# Patient Record
Sex: FEMALE | Race: WHITE | Employment: OTHER | URBAN - METROPOLITAN AREA
[De-identification: names, ages, dates, MRNs, and addresses within clinical notes are randomized per-mention and may not be internally consistent; named-entity substitution may affect disease eponyms.]

---

## 2017-02-07 ENCOUNTER — TELEPHONE (OUTPATIENT)
Dept: CARDIOLOGY CLINIC | Age: 67
End: 2017-02-07

## 2017-02-08 ENCOUNTER — CLINICAL SUPPORT (OUTPATIENT)
Dept: CARDIOLOGY CLINIC | Age: 67
End: 2017-02-08

## 2017-02-08 DIAGNOSIS — R06.09 DOE (DYSPNEA ON EXERTION): Primary | ICD-10-CM

## 2017-02-08 NOTE — PROGRESS NOTES
See scanned report. Dr. Madeline Herbert ordered and Dr. Madeline Herbert read study. ID verified per protocol.

## 2017-10-16 ENCOUNTER — APPOINTMENT (OUTPATIENT)
Dept: GENERAL RADIOLOGY | Age: 67
End: 2017-10-16
Attending: EMERGENCY MEDICINE
Payer: MEDICARE

## 2017-10-16 ENCOUNTER — HOSPITAL ENCOUNTER (EMERGENCY)
Age: 67
Discharge: HOME | End: 2017-10-16
Attending: EMERGENCY MEDICINE | Admitting: EMERGENCY MEDICINE
Payer: MEDICARE

## 2017-10-16 VITALS
DIASTOLIC BLOOD PRESSURE: 59 MMHG | BODY MASS INDEX: 27.94 KG/M2 | HEIGHT: 61 IN | TEMPERATURE: 98.1 F | SYSTOLIC BLOOD PRESSURE: 133 MMHG | WEIGHT: 148 LBS | RESPIRATION RATE: 14 BRPM | HEART RATE: 76 BPM | OXYGEN SATURATION: 100 %

## 2017-10-16 DIAGNOSIS — R07.9 CHEST PAIN, UNSPECIFIED TYPE: Primary | ICD-10-CM

## 2017-10-16 DIAGNOSIS — R06.2 WHEEZING: ICD-10-CM

## 2017-10-16 DIAGNOSIS — J20.9 ACUTE BRONCHITIS, UNSPECIFIED ORGANISM: ICD-10-CM

## 2017-10-16 LAB
ALBUMIN SERPL-MCNC: 3.5 G/DL (ref 3.5–5)
ALBUMIN/GLOB SERPL: 0.9 {RATIO} (ref 1.1–2.2)
ALP SERPL-CCNC: 124 U/L (ref 45–117)
ALT SERPL-CCNC: 24 U/L (ref 12–78)
ANION GAP SERPL CALC-SCNC: 9 MMOL/L (ref 5–15)
AST SERPL-CCNC: 15 U/L (ref 15–37)
ATRIAL RATE: 81 BPM
BASOPHILS # BLD: 0 K/UL (ref 0–0.1)
BASOPHILS NFR BLD: 0 % (ref 0–1)
BILIRUB SERPL-MCNC: 0.3 MG/DL (ref 0.2–1)
BUN SERPL-MCNC: 20 MG/DL (ref 6–20)
BUN/CREAT SERPL: 22 (ref 12–20)
CALCIUM SERPL-MCNC: 9 MG/DL (ref 8.5–10.1)
CALCULATED P AXIS, ECG09: 20 DEGREES
CALCULATED R AXIS, ECG10: -3 DEGREES
CALCULATED T AXIS, ECG11: 43 DEGREES
CHLORIDE SERPL-SCNC: 102 MMOL/L (ref 97–108)
CO2 SERPL-SCNC: 29 MMOL/L (ref 21–32)
CREAT SERPL-MCNC: 0.89 MG/DL (ref 0.55–1.02)
DIAGNOSIS, 93000: NORMAL
EOSINOPHIL # BLD: 0.2 K/UL (ref 0–0.4)
EOSINOPHIL NFR BLD: 2 % (ref 0–7)
ERYTHROCYTE [DISTWIDTH] IN BLOOD BY AUTOMATED COUNT: 13 % (ref 11.5–14.5)
GLOBULIN SER CALC-MCNC: 3.7 G/DL (ref 2–4)
GLUCOSE SERPL-MCNC: 151 MG/DL (ref 65–100)
HCT VFR BLD AUTO: 37.9 % (ref 35–47)
HGB BLD-MCNC: 12.5 G/DL (ref 11.5–16)
LYMPHOCYTES # BLD: 2.1 K/UL (ref 0.8–3.5)
LYMPHOCYTES NFR BLD: 16 % (ref 12–49)
MAGNESIUM SERPL-MCNC: 1.8 MG/DL (ref 1.6–2.4)
MCH RBC QN AUTO: 31.6 PG (ref 26–34)
MCHC RBC AUTO-ENTMCNC: 33 G/DL (ref 30–36.5)
MCV RBC AUTO: 95.7 FL (ref 80–99)
MONOCYTES # BLD: 0.8 K/UL (ref 0–1)
MONOCYTES NFR BLD: 6 % (ref 5–13)
NEUTS SEG # BLD: 9.7 K/UL (ref 1.8–8)
NEUTS SEG NFR BLD: 76 % (ref 32–75)
P-R INTERVAL, ECG05: 112 MS
PLATELET # BLD AUTO: 264 K/UL (ref 150–400)
POTASSIUM SERPL-SCNC: 3.2 MMOL/L (ref 3.5–5.1)
PROT SERPL-MCNC: 7.2 G/DL (ref 6.4–8.2)
Q-T INTERVAL, ECG07: 354 MS
QRS DURATION, ECG06: 78 MS
QTC CALCULATION (BEZET), ECG08: 411 MS
RBC # BLD AUTO: 3.96 M/UL (ref 3.8–5.2)
SODIUM SERPL-SCNC: 140 MMOL/L (ref 136–145)
TROPONIN I SERPL-MCNC: <0.04 NG/ML
VENTRICULAR RATE, ECG03: 81 BPM
WBC # BLD AUTO: 12.8 K/UL (ref 3.6–11)

## 2017-10-16 PROCEDURE — 99284 EMERGENCY DEPT VISIT MOD MDM: CPT

## 2017-10-16 PROCEDURE — 74011000250 HC RX REV CODE- 250: Performed by: EMERGENCY MEDICINE

## 2017-10-16 PROCEDURE — 96375 TX/PRO/DX INJ NEW DRUG ADDON: CPT

## 2017-10-16 PROCEDURE — 96361 HYDRATE IV INFUSION ADD-ON: CPT

## 2017-10-16 PROCEDURE — 83735 ASSAY OF MAGNESIUM: CPT | Performed by: EMERGENCY MEDICINE

## 2017-10-16 PROCEDURE — 96374 THER/PROPH/DIAG INJ IV PUSH: CPT

## 2017-10-16 PROCEDURE — 94640 AIRWAY INHALATION TREATMENT: CPT

## 2017-10-16 PROCEDURE — 85025 COMPLETE CBC W/AUTO DIFF WBC: CPT | Performed by: EMERGENCY MEDICINE

## 2017-10-16 PROCEDURE — 71020 XR CHEST PA LAT: CPT

## 2017-10-16 PROCEDURE — 94761 N-INVAS EAR/PLS OXIMETRY MLT: CPT

## 2017-10-16 PROCEDURE — 80053 COMPREHEN METABOLIC PANEL: CPT | Performed by: EMERGENCY MEDICINE

## 2017-10-16 PROCEDURE — 74011250636 HC RX REV CODE- 250/636: Performed by: EMERGENCY MEDICINE

## 2017-10-16 PROCEDURE — 77030013140 HC MSK NEB VYRM -A

## 2017-10-16 PROCEDURE — 84484 ASSAY OF TROPONIN QUANT: CPT | Performed by: EMERGENCY MEDICINE

## 2017-10-16 PROCEDURE — 93005 ELECTROCARDIOGRAM TRACING: CPT

## 2017-10-16 RX ORDER — SODIUM CHLORIDE 0.9 % (FLUSH) 0.9 %
5-10 SYRINGE (ML) INJECTION EVERY 8 HOURS
Status: DISCONTINUED | OUTPATIENT
Start: 2017-10-16 | End: 2017-10-16 | Stop reason: HOSPADM

## 2017-10-16 RX ORDER — SODIUM CHLORIDE 0.9 % (FLUSH) 0.9 %
5-10 SYRINGE (ML) INJECTION AS NEEDED
Status: DISCONTINUED | OUTPATIENT
Start: 2017-10-16 | End: 2017-10-16 | Stop reason: HOSPADM

## 2017-10-16 RX ORDER — KETOROLAC TROMETHAMINE 30 MG/ML
15 INJECTION, SOLUTION INTRAMUSCULAR; INTRAVENOUS
Status: COMPLETED | OUTPATIENT
Start: 2017-10-16 | End: 2017-10-16

## 2017-10-16 RX ORDER — ALBUTEROL SULFATE 0.83 MG/ML
2.5 SOLUTION RESPIRATORY (INHALATION)
Qty: 24 EACH | Refills: 0 | Status: SHIPPED | OUTPATIENT
Start: 2017-10-16

## 2017-10-16 RX ORDER — DOXYCYCLINE 100 MG/1
100 TABLET ORAL 2 TIMES DAILY
Qty: 20 TAB | Refills: 0 | Status: SHIPPED | OUTPATIENT
Start: 2017-10-16 | End: 2017-10-26

## 2017-10-16 RX ORDER — PREDNISONE 10 MG/1
TABLET ORAL
Qty: 21 TAB | Refills: 0 | Status: SHIPPED | OUTPATIENT
Start: 2017-10-16 | End: 2018-03-19 | Stop reason: ALTCHOICE

## 2017-10-16 RX ORDER — DOXYCYCLINE 100 MG/1
100 TABLET ORAL 2 TIMES DAILY
Qty: 20 TAB | Refills: 0 | Status: SHIPPED | OUTPATIENT
Start: 2017-10-16 | End: 2017-10-16

## 2017-10-16 RX ORDER — ALBUTEROL SULFATE 90 UG/1
2 AEROSOL, METERED RESPIRATORY (INHALATION)
Qty: 1 INHALER | Refills: 0 | Status: SHIPPED | OUTPATIENT
Start: 2017-10-16

## 2017-10-16 RX ADMIN — ALBUTEROL SULFATE 1 DOSE: 2.5 SOLUTION RESPIRATORY (INHALATION) at 03:38

## 2017-10-16 RX ADMIN — SODIUM CHLORIDE 1000 ML: 9 INJECTION, SOLUTION INTRAVENOUS at 02:34

## 2017-10-16 RX ADMIN — KETOROLAC TROMETHAMINE 15 MG: 30 INJECTION, SOLUTION INTRAMUSCULAR at 03:38

## 2017-10-16 RX ADMIN — ALBUTEROL SULFATE 1 DOSE: 2.5 SOLUTION RESPIRATORY (INHALATION) at 02:33

## 2017-10-16 RX ADMIN — METHYLPREDNISOLONE SODIUM SUCCINATE 125 MG: 125 INJECTION, POWDER, FOR SOLUTION INTRAMUSCULAR; INTRAVENOUS at 02:34

## 2017-10-16 NOTE — ED PROVIDER NOTES
HPI Comments: 74 y.o. female with past medical history significant for HTN, Hypercholesterolemia who presents from home with chief complaint of chest pain. Pt reports 1 day hx of persistent chest pain and tightness. Pt describes the pain as \"burning\" accompanied by SOB. She states that it is difficult to take a deep breath and has also had a productive cough with clear phlegm. Pt is a smoker but denies smoking within the last three days. There are no other acute medical concerns at this time. PCP: Urszula Shaffer MD    Note written by Nj Bee, as dictated by Andre Scott DO 2:23 AM    The history is provided by the patient. No  was used. Past Medical History:   Diagnosis Date    Anxiety     Depression     Falls     Fatigue     FH: mental illness     Hypercholesterolemia     Hypertension     Vertigo     Visual disturbance        No past surgical history on file. No family history on file. Social History     Social History    Marital status:      Spouse name: N/A    Number of children: N/A    Years of education: N/A     Occupational History    Not on file. Social History Main Topics    Smoking status: Current Every Day Smoker     Packs/day: 0.50    Smokeless tobacco: Not on file    Alcohol use No    Drug use: No    Sexual activity: Not on file     Other Topics Concern    Not on file     Social History Narrative     ALLERGIES: Pcn [penicillins]    Review of Systems   Constitutional: Negative for appetite change, chills, fever and unexpected weight change. HENT: Negative for ear pain, hearing loss, rhinorrhea and trouble swallowing. Eyes: Negative for pain and visual disturbance. Respiratory: Positive for cough (productive) and chest tightness. Negative for shortness of breath. Cardiovascular: Positive for chest pain. Negative for palpitations.    Gastrointestinal: Negative for abdominal distention, abdominal pain, blood in stool and vomiting. Genitourinary: Negative for dysuria, hematuria and urgency. Musculoskeletal: Negative for back pain and myalgias. Skin: Negative for rash. Neurological: Negative for dizziness, syncope, weakness and numbness. Psychiatric/Behavioral: Negative for confusion and suicidal ideas. All other systems reviewed and are negative. Vitals:    10/16/17 0202 10/16/17 0217   BP: 147/72    Pulse: 79    Resp: 18    Temp: 98.1 °F (36.7 °C)    SpO2: 99% 97%   Weight: 67.1 kg (148 lb)    Height: 5' 1\" (1.549 m)             Physical Exam   Constitutional: She is oriented to person, place, and time. She appears well-developed and well-nourished. No distress. HENT:   Head: Normocephalic and atraumatic. Right Ear: External ear normal.   Left Ear: External ear normal.   Nose: Nose normal.   Mouth/Throat: Oropharynx is clear and moist. No oropharyngeal exudate. Eyes: Conjunctivae and EOM are normal. Pupils are equal, round, and reactive to light. Right eye exhibits no discharge. Left eye exhibits no discharge. No scleral icterus. Neck: Normal range of motion. Neck supple. No JVD present. No tracheal deviation present. Cardiovascular: Normal rate, regular rhythm, normal heart sounds and intact distal pulses. Exam reveals no gallop and no friction rub. No murmur heard. Pulmonary/Chest: Effort normal. No stridor. She has decreased breath sounds in the left lower field. She has wheezes (faint, bibasilar). She has no rhonchi. She exhibits no tenderness. Prolonged exhalation   Abdominal: Soft. Bowel sounds are normal. She exhibits no distension. There is no tenderness. There is no rebound and no guarding. Musculoskeletal: Normal range of motion. She exhibits no edema or tenderness. Neurological: She is alert and oriented to person, place, and time. She has normal strength and normal reflexes. No cranial nerve deficit or sensory deficit. She exhibits normal muscle tone.  Coordination normal. GCS eye subscore is 4. GCS verbal subscore is 5. GCS motor subscore is 6. Skin: Skin is warm and dry. No rash noted. She is not diaphoretic. No erythema. No pallor. Psychiatric: She has a normal mood and affect. Her behavior is normal. Judgment and thought content normal.   Nursing note and vitals reviewed. Note written by Nj Mckinney, as dictated by Lakisha Young DO 2:28 AM     MDM  Number of Diagnoses or Management Options  Acute bronchitis, unspecified organism:   Chest pain, unspecified type: Wheezing:      Amount and/or Complexity of Data Reviewed  Clinical lab tests: ordered and reviewed  Tests in the radiology section of CPT®: ordered and reviewed  Tests in the medicine section of CPT®: ordered and reviewed  Independent visualization of images, tracings, or specimens: yes (ekg)    Risk of Complications, Morbidity, and/or Mortality  Presenting problems: moderate  Diagnostic procedures: low  Management options: moderate    Patient Progress  Patient progress: stable    ED Course       Procedures  Chief Complaint   Patient presents with    Chest Pain       5:30 AM  The patients presenting problems have been discussed, and they are in agreement with the care plan formulated and outlined with them. I have encouraged them to ask questions as they arise throughout their visit.     MEDICATIONS GIVEN:  Medications   sodium chloride (NS) flush 5-10 mL (not administered)   sodium chloride (NS) flush 5-10 mL (not administered)   methylPREDNISolone (PF) (SOLU-MEDROL) injection 125 mg (125 mg IntraVENous Given 10/16/17 0234)   albuterol 5mg / ipratropium 0.5mg neb solution (1 Dose Nebulization Given 10/16/17 0233)   sodium chloride 0.9 % bolus infusion 1,000 mL (0 mL IntraVENous IV Completed 10/16/17 0334)   ketorolac (TORADOL) injection 15 mg (15 mg IntraVENous Given 10/16/17 0338)   albuterol 5mg / ipratropium 0.5mg neb solution (1 Dose Nebulization Given 10/16/17 0338)       LABS REVIEWED:  Recent Results (from the past 24 hour(s))   EKG, 12 LEAD, INITIAL    Collection Time: 10/16/17  2:08 AM   Result Value Ref Range    Ventricular Rate 81 BPM    Atrial Rate 81 BPM    P-R Interval 112 ms    QRS Duration 78 ms    Q-T Interval 354 ms    QTC Calculation (Bezet) 411 ms    Calculated P Axis 20 degrees    Calculated R Axis -3 degrees    Calculated T Axis 43 degrees    Diagnosis       Sinus rhythm with occasional premature ventricular complexes  Otherwise normal ECG  When compared with ECG of 23-MAY-2011 11:00,  MANUAL COMPARISON REQUIRED, DATA IS UNCONFIRMED     MAGNESIUM    Collection Time: 10/16/17  2:14 AM   Result Value Ref Range    Magnesium 1.8 1.6 - 2.4 mg/dL   CBC WITH AUTOMATED DIFF    Collection Time: 10/16/17  2:14 AM   Result Value Ref Range    WBC 12.8 (H) 3.6 - 11.0 K/uL    RBC 3.96 3.80 - 5.20 M/uL    HGB 12.5 11.5 - 16.0 g/dL    HCT 37.9 35.0 - 47.0 %    MCV 95.7 80.0 - 99.0 FL    MCH 31.6 26.0 - 34.0 PG    MCHC 33.0 30.0 - 36.5 g/dL    RDW 13.0 11.5 - 14.5 %    PLATELET 554 627 - 247 K/uL    NEUTROPHILS 76 (H) 32 - 75 %    LYMPHOCYTES 16 12 - 49 %    MONOCYTES 6 5 - 13 %    EOSINOPHILS 2 0 - 7 %    BASOPHILS 0 0 - 1 %    ABS. NEUTROPHILS 9.7 (H) 1.8 - 8.0 K/UL    ABS. LYMPHOCYTES 2.1 0.8 - 3.5 K/UL    ABS. MONOCYTES 0.8 0.0 - 1.0 K/UL    ABS. EOSINOPHILS 0.2 0.0 - 0.4 K/UL    ABS.  BASOPHILS 0.0 0.0 - 0.1 K/UL   METABOLIC PANEL, COMPREHENSIVE    Collection Time: 10/16/17  2:14 AM   Result Value Ref Range    Sodium 140 136 - 145 mmol/L    Potassium 3.2 (L) 3.5 - 5.1 mmol/L    Chloride 102 97 - 108 mmol/L    CO2 29 21 - 32 mmol/L    Anion gap 9 5 - 15 mmol/L    Glucose 151 (H) 65 - 100 mg/dL    BUN 20 6 - 20 MG/DL    Creatinine 0.89 0.55 - 1.02 MG/DL    BUN/Creatinine ratio 22 (H) 12 - 20      GFR est AA >60 >60 ml/min/1.73m2    GFR est non-AA >60 >60 ml/min/1.73m2    Calcium 9.0 8.5 - 10.1 MG/DL    Bilirubin, total 0.3 0.2 - 1.0 MG/DL    ALT (SGPT) 24 12 - 78 U/L    AST (SGOT) 15 15 - 37 U/L    Alk. phosphatase 124 (H) 45 - 117 U/L    Protein, total 7.2 6.4 - 8.2 g/dL    Albumin 3.5 3.5 - 5.0 g/dL    Globulin 3.7 2.0 - 4.0 g/dL    A-G Ratio 0.9 (L) 1.1 - 2.2     TROPONIN I    Collection Time: 10/16/17  2:14 AM   Result Value Ref Range    Troponin-I, Qt. <0.04 <0.05 ng/mL       VITAL SIGNS:  Patient Vitals for the past 12 hrs:   Temp Pulse Resp BP SpO2   10/16/17 0400 - 76 14 133/59 100 %   10/16/17 0330 - 76 29 140/60 95 %   10/16/17 0300 - 79 25 137/54 -   10/16/17 0217 - - - - 97 %   10/16/17 0202 98.1 °F (36.7 °C) 79 18 147/72 99 %       RADIOLOGY RESULTS:  The following have been ordered and reviewed:  XR CHEST PA LAT   Final Result        Study Result      INDICATION:   cough, cp     COMPARISON: April 4, 2016     FINDINGS:     Frontal and lateral views of the chest demonstrate a normal cardiomediastinal  silhouette. The lungs are adequately expanded. There is a small left pleural  effusion. No pulmonary edema, pneumothorax, or significant airspace disease. The  osseous structures are unremarkable.     IMPRESSION  IMPRESSION:  Small left pleural effusion. ED EKG interpretation:  Rhythm: normal sinus rhythm and occasional PVC's; and regular . Rate (approx.): 81; Axis: normal; P wave: normal; QRS interval: normal ; ST/T wave: normal; Other findings: normal. This EKG was interpreted by Diana Hsieh DO,ED Provider. PROGRESS NOTES:  Pt feeling better. Breath sounds improved. Discussed results and plan with patient. encouraged stopping smoking. Patient will be discharged home with PCP followup. Patient instructed to return to the emergency room for any worsening symptoms or any other concerns. DIAGNOSIS:    1. Chest pain, unspecified type    2. Wheezing    3.  Acute bronchitis, unspecified organism        PLAN:  Follow-up Information     Follow up With Details Comments Sy Cheema MD Schedule an appointment as soon as possible for a visit  [address] [facility] [address]  [phone]      [facility] EMERGENCY DEPT  If symptoms worsen [address]  [phone]        Discharge Medication List as of 10/16/2017  4:13 AM      START taking these medications    Details   predniSONE (STERAPRED DS) 10 mg dose pack Take as directed. , Print, Disp-21 Tab, R-0      albuterol (PROVENTIL HFA, VENTOLIN HFA, PROAIR HFA) 90 mcg/actuation inhaler Take 2 Puffs by inhalation every four (4) hours as needed for Wheezing or Shortness of Breath., Print, Disp-1 Inhaler, R-0      albuterol (PROVENTIL VENTOLIN) 2.5 mg /3 mL (0.083 %) nebulizer solution 3 mL by Nebulization route every four (4) hours as needed for Wheezing., Print, Disp-24 Each, R-0      doxycycline (ADOXA) 100 mg tablet Take 1 Tab by mouth two (2) times a day for 10 days. , Normal, Disp-20 Tab, R-0         CONTINUE these medications which have NOT CHANGED    Details   carvedilol (COREG) 3.125 mg tablet Take 1 Tab by mouth two (2) times daily (with meals). , Normal, Disp-60 Tab, R-4      multivitamin (ONE A DAY) tablet Take 1 Tab by mouth daily. , Historical Med      ibuprofen (MOTRIN) 800 mg tablet Take 800 mg by mouth as needed., Historical Med      primidone (MYSOLINE) 50 mg tablet Take 1 tablet by mouth nightly. , Print, Disp-30 tablet, R-3      simvastatin (ZOCOR) 20 mg tablet Take  by mouth nightly., Historical Med      potassium chloride SR (KLOR-CON 10) 10 mEq tablet Take 20 mEq by mouth two (2) times a day., Historical Med      buPROPion XL (WELLBUTRIN XL) 300 mg XL tablet Take 300 mg by mouth every morning., Historical Med      hydrochlorothiazide (HYDRODIURIL) 25 mg tablet Take 25 mg by mouth daily. , Historical Med      alprazolam (XANAX) 0.25 mg tablet Take  by mouth daily. , Historical Med      aspirin delayed-release 81 mg tablet Take  by mouth daily. , Historical Med               ED COURSE: The patients hospital course has been uncomplicated.

## 2017-10-16 NOTE — ED TRIAGE NOTES
Patient arrives with c/o L sided chest pain that radiates into neck since yesterday with diaphoresis.

## 2017-10-16 NOTE — ED NOTES
Pt was educated on DC instructions by physician. Pt stated understanding. Pt was able to ambulate to lobby with family member without incident. Pt stated she has all belongings.

## 2017-10-16 NOTE — DISCHARGE INSTRUCTIONS
We hope that we have addressed all of your medical concerns. The examination and treatment you received in the Emergency Department were for an emergent problem and were not intended as complete care. It is important that you follow up with your healthcare provider(s) for ongoing care. If your symptoms worsen or do not improve as expected, and you are unable to reach your usual health care provider(s), you should return to the Emergency Department. Today's healthcare is undergoing tremendous change, and patient satisfaction surveys are one of the many tools to assess the quality of medical care. You may receive a survey from the [employer] regarding your experience in the Emergency Department. I hope that your experience has been completely positive, particularly the medical care that I provided. As such, please participate in the survey; anything less than excellent does not meet my expectations or intentions. [facility] and [facility] participate in nationally recognized quality of care measures. If your blood pressure is greater than 120/80, as reported below, we urge that you seek medical care to address the potential of high blood pressure, commonly known as hypertension. Hypertension can be hereditary or can be caused by certain medical conditions, pain, stress, or \"white coat syndrome. \"       Please make an appointment with your health care provider(s) for follow up of your Emergency Department visit. VITALS:   Patient Vitals for the past 8 hrs:   Temp Pulse Resp BP SpO2   10/16/17 0217 - - - - 97 %   10/16/17 0202 98.1 °F (36.7 °C) 79 18 147/72 99 %          Thank you for allowing us to provide you with medical care today. We realize that you have many choices for your emergency care needs. Please choose us in the future for any continued health care needs. Feliciano Randall, [address] Emergency [facility].   Office: [phone]            Recent Results (from the past 24 hour(s))   EKG, 12 LEAD, INITIAL    Collection Time: 10/16/17  2:08 AM   Result Value Ref Range    Ventricular Rate 81 BPM    Atrial Rate 81 BPM    P-R Interval 112 ms    QRS Duration 78 ms    Q-T Interval 354 ms    QTC Calculation (Bezet) 411 ms    Calculated P Axis 20 degrees    Calculated R Axis -3 degrees    Calculated T Axis 43 degrees    Diagnosis       Sinus rhythm with occasional premature ventricular complexes  Otherwise normal ECG  When compared with ECG of 23-MAY-2011 11:00,  MANUAL COMPARISON REQUIRED, DATA IS UNCONFIRMED     MAGNESIUM    Collection Time: 10/16/17  2:14 AM   Result Value Ref Range    Magnesium 1.8 1.6 - 2.4 mg/dL   CBC WITH AUTOMATED DIFF    Collection Time: 10/16/17  2:14 AM   Result Value Ref Range    WBC 12.8 (H) 3.6 - 11.0 K/uL    RBC 3.96 3.80 - 5.20 M/uL    HGB 12.5 11.5 - 16.0 g/dL    HCT 37.9 35.0 - 47.0 %    MCV 95.7 80.0 - 99.0 FL    MCH 31.6 26.0 - 34.0 PG    MCHC 33.0 30.0 - 36.5 g/dL    RDW 13.0 11.5 - 14.5 %    PLATELET 200 181 - 437 K/uL    NEUTROPHILS 76 (H) 32 - 75 %    LYMPHOCYTES 16 12 - 49 %    MONOCYTES 6 5 - 13 %    EOSINOPHILS 2 0 - 7 %    BASOPHILS 0 0 - 1 %    ABS. NEUTROPHILS 9.7 (H) 1.8 - 8.0 K/UL    ABS. LYMPHOCYTES 2.1 0.8 - 3.5 K/UL    ABS. MONOCYTES 0.8 0.0 - 1.0 K/UL    ABS. EOSINOPHILS 0.2 0.0 - 0.4 K/UL    ABS.  BASOPHILS 0.0 0.0 - 0.1 K/UL   METABOLIC PANEL, COMPREHENSIVE    Collection Time: 10/16/17  2:14 AM   Result Value Ref Range    Sodium 140 136 - 145 mmol/L    Potassium 3.2 (L) 3.5 - 5.1 mmol/L    Chloride 102 97 - 108 mmol/L    CO2 29 21 - 32 mmol/L    Anion gap 9 5 - 15 mmol/L    Glucose 151 (H) 65 - 100 mg/dL    BUN 20 6 - 20 MG/DL    Creatinine 0.89 0.55 - 1.02 MG/DL    BUN/Creatinine ratio 22 (H) 12 - 20      GFR est AA >60 >60 ml/min/1.73m2    GFR est non-AA >60 >60 ml/min/1.73m2    Calcium 9.0 8.5 - 10.1 MG/DL    Bilirubin, total 0.3 0.2 - 1.0 MG/DL    ALT (SGPT) 24 12 - 78 U/L    AST (SGOT) 15 15 - 37 U/L    Alk. phosphatase 124 (H) 45 - 117 U/L    Protein, total 7.2 6.4 - 8.2 g/dL    Albumin 3.5 3.5 - 5.0 g/dL    Globulin 3.7 2.0 - 4.0 g/dL    A-G Ratio 0.9 (L) 1.1 - 2.2     TROPONIN I    Collection Time: 10/16/17  2:14 AM   Result Value Ref Range    Troponin-I, Qt. <0.04 <0.05 ng/mL       Xr Chest Pa Lat    Result Date: 10/16/2017  INDICATION:   cough, cp COMPARISON: April 4, 2016 FINDINGS: Frontal and lateral views of the chest demonstrate a normal cardiomediastinal silhouette. The lungs are adequately expanded. There is a small left pleural effusion. No pulmonary edema, pneumothorax, or significant airspace disease. The osseous structures are unremarkable. IMPRESSION: Small left pleural effusion. Chest Pain: Care Instructions  Your Care Instructions  There are many things that can cause chest pain. Some are not serious and will get better on their own in a few days. But some kinds of chest pain need more testing and treatment. Your doctor may have recommended a follow-up visit in the next 8 to 12 hours. If you are not getting better, you may need more tests or treatment. Even though your doctor has released you, you still need to watch for any problems. The doctor carefully checked you, but sometimes problems can develop later. If you have new symptoms or if your symptoms do not get better, get medical care right away. If you have worse or different chest pain or pressure that lasts more than 5 minutes or you passed out (lost consciousness), call 911 or seek other emergency help right away. A medical visit is only one step in your treatment. Even if you feel better, you still need to do what your doctor recommends, such as going to all suggested follow-up appointments and taking medicines exactly as directed. This will help you recover and help prevent future problems. How can you care for yourself at home?   · Rest until you feel better. · Take your medicine exactly as prescribed. Call your doctor if you think you are having a problem with your medicine. · Do not drive after taking a prescription pain medicine. When should you call for help? Call 911 if:  · You passed out (lost consciousness). · You have severe difficulty breathing. · You have symptoms of a heart attack. These may include:  ¨ Chest pain or pressure, or a strange feeling in your chest.  ¨ Sweating. ¨ Shortness of breath. ¨ Nausea or vomiting. ¨ Pain, pressure, or a strange feeling in your back, neck, jaw, or upper belly or in one or both shoulders or arms. ¨ Lightheadedness or sudden weakness. ¨ A fast or irregular heartbeat. After you call 911, the  may tell you to chew 1 adult-strength or 2 to 4 low-dose aspirin. Wait for an ambulance. Do not try to drive yourself. Call your doctor today if:  · You have any trouble breathing. · Your chest pain gets worse. · You are dizzy or lightheaded, or you feel like you may faint. · You are not getting better as expected. · You are having new or different chest pain. Where can you learn more? Go to http://valdo-justus.info/. Enter A120 in the search box to learn more about \"Chest Pain: Care Instructions. \"  Current as of: March 20, 2017  Content Version: 11.3  © 0031-4271 [employer]. Care instructions adapted under license by [employer] (which disclaims liability or warranty for this information). If you have questions about a medical condition or this instruction, always ask your healthcare professional. [address] any warranty or liability for your use of this information. Bronchitis: Care Instructions  Your Care Instructions    Bronchitis is inflammation of the bronchial tubes, which carry air to the lungs. The tubes swell and produce mucus, or phlegm. The mucus and inflamed bronchial tubes make you cough.  You may have trouble breathing. Most cases of bronchitis are caused by viruses like those that cause colds. Antibiotics usually do not help and they may be harmful. Bronchitis usually develops rapidly and lasts about 2 to 3 weeks in otherwise healthy people. Follow-up care is a key part of your treatment and safety. Be sure to make and go to all appointments, and call your doctor if you are having problems. It's also a good idea to know your test results and keep a list of the medicines you take. How can you care for yourself at home? · Take all medicines exactly as prescribed. Call your doctor if you think you are having a problem with your medicine. · Get some extra rest.  · Take an over-the-counter pain medicine, such as acetaminophen (Tylenol), ibuprofen (Advil, Motrin), or naproxen (Aleve) to reduce fever and relieve body aches. Read and follow all instructions on the label. · Do not take two or more pain medicines at the same time unless the doctor told you to. Many pain medicines have acetaminophen, which is Tylenol. Too much acetaminophen (Tylenol) can be harmful. · Take an over-the-counter cough medicine that contains dextromethorphan to help quiet a dry, hacking cough so that you can sleep. Avoid cough medicines that have more than one active ingredient. Read and follow all instructions on the label. · Breathe moist air from a humidifier, hot shower, or sink filled with hot water. The heat and moisture will thin mucus so you can cough it out. · Do not smoke. Smoking can make bronchitis worse. If you need help quitting, talk to your doctor about stop-smoking programs and medicines. These can increase your chances of quitting for good. When should you call for help? Call 911 anytime you think you may need emergency care. For example, call if:  · You have severe trouble breathing. Call your doctor now or seek immediate medical care if:  · You have new or worse trouble breathing.   · You cough up dark brown or bloody mucus (sputum). · You have a new or higher fever. · You have a new rash. Watch closely for changes in your health, and be sure to contact your doctor if:  · You cough more deeply or more often, especially if you notice more mucus or a change in the color of your mucus. · You are not getting better as expected. Where can you learn more? Go to http://valdo-justus.info/. Enter H333 in the search box to learn more about \"Bronchitis: Care Instructions. \"  Current as of: March 25, 2017  Content Version: 11.3  © 9639-1018 [employer]. Care instructions adapted under license by [employer] (which disclaims liability or warranty for this information). If you have questions about a medical condition or this instruction, always ask your healthcare professional. [address] any warranty or liability for your use of this information. Wheezing or Bronchoconstriction: Care Instructions  Your Care Instructions  Wheezing is a whistling noise made during breathing. It occurs when the small airways, or bronchial tubes, that lead to your lungs swell or contract (spasm) and become narrow. This narrowing is called bronchoconstriction. When your airways constrict, it is hard for air to pass through and this makes it hard for you to breathe. Wheezing and bronchoconstriction can be caused by many problems, including:  · An infection such as the flu or a cold. · Allergies such as hay fever. · Diseases such as asthma or chronic obstructive pulmonary disease. · Smoking. Treatment for your wheezing depends on what is causing the problem. Your wheezing may get better without treatment. But you may need to pay attention to things that cause your wheezing and avoid them. Or you may need medicine to help treat the wheezing and to reduce the swelling or to relieve spasms in your lungs. Follow-up care is a key part of your treatment and safety.  Be sure to make and go to all appointments, and call your doctor if you are having problems. It is also a good idea to know your test results and keep a list of the medicines you take. How can you care for yourself at home? · Take your medicine exactly as prescribed. Call your doctor if you think you are having a problem with your medicine. You will get more details on the specific medicine your doctor prescribes. · If your doctor prescribed antibiotics, take them as directed. Do not stop taking them just because you feel better. You need to take the full course of antibiotics. · Breathe moist air from a humidifier, hot shower, or sink filled with hot water. This may help ease your symptoms and make it easier for you to breathe. · If you have congestion in your nose and throat, drinking plenty of fluids, especially hot fluids, may help relieve your symptoms. If you have kidney, heart, or liver disease and have to limit fluids, talk with your doctor before you increase the amount of fluids you drink. · If you have mucus in your airways, it may help to breathe deeply and cough. · Do not smoke or allow others to smoke around you. Smoking can make your wheezing worse. If you need help quitting, talk to your doctor about stop-smoking programs and medicines. These can increase your chances of quitting for good. · Avoid things that may cause your wheezing. These may include colds, smoke, air pollution, dust, pollen, pets, cockroaches, stress, and cold air. When should you call for help? Call 911 anytime you think you may need emergency care. For example, call if:  · You have severe trouble breathing. · You passed out (lost consciousness). Call your doctor now or seek immediate medical care if:  · You cough up yellow, dark brown, or bloody mucus (sputum). · You have new or worse shortness of breath. · Your wheezing is not getting better or it gets worse after you start taking your medicine.   Watch closely for changes in your health, and be sure to contact your doctor if:  · You do not get better as expected. Where can you learn more? Go to http://valdo-justus.info/. Enter 454 3124 in the search box to learn more about \"Wheezing or Bronchoconstriction: Care Instructions. \"  Current as of: March 25, 2017  Content Version: 11.3  © 7325-7089 [employer]. Care instructions adapted under license by [employer] (which disclaims liability or warranty for this information). If you have questions about a medical condition or this instruction, always ask your healthcare professional. [place] [address] any warranty or liability for your use of this information.

## 2018-03-19 ENCOUNTER — OFFICE VISIT (OUTPATIENT)
Dept: CARDIOLOGY CLINIC | Age: 68
End: 2018-03-19

## 2018-03-19 VITALS
HEART RATE: 78 BPM | RESPIRATION RATE: 20 BRPM | SYSTOLIC BLOOD PRESSURE: 132 MMHG | DIASTOLIC BLOOD PRESSURE: 78 MMHG | HEIGHT: 60 IN | WEIGHT: 161.6 LBS | OXYGEN SATURATION: 94 % | BODY MASS INDEX: 31.73 KG/M2

## 2018-03-19 DIAGNOSIS — R00.2 PALPITATIONS: Primary | ICD-10-CM

## 2018-03-19 DIAGNOSIS — R07.89 ATYPICAL CHEST PAIN: ICD-10-CM

## 2018-03-19 DIAGNOSIS — I10 ESSENTIAL HYPERTENSION: ICD-10-CM

## 2018-03-19 DIAGNOSIS — R06.00 DYSPNEA, UNSPECIFIED TYPE: ICD-10-CM

## 2018-03-19 RX ORDER — CARVEDILOL 6.25 MG/1
6.25 TABLET ORAL 2 TIMES DAILY WITH MEALS
Qty: 62 TAB | Refills: 6 | Status: SHIPPED | OUTPATIENT
Start: 2018-03-19

## 2018-03-19 NOTE — MR AVS SNAPSHOT
[address] [facility] [address] [place] [phone] Patient: Maryana Lerner MRN: [id number] MTY:[id number] Visit Information Date & Time Provider Department Dept. Phone Encounter #  
 3/19/2018  2:00 PM Sheila Lambert MD [facility] [place] [phone] [id number] Upcoming Health Maintenance Date Due Hepatitis C Screening 1950 DTaP/Tdap/Td series (1 - Tdap) 2/21/1973 BREAST CANCER SCRN MAMMOGRAM 2/21/2002 FOBT Q 1 YEAR AGE 50-75 2/21/2002 ZOSTER VACCINE AGE 60> 12/21/2011 GLAUCOMA SCREENING Q2Y 2/21/2017 Bone Densitometry (Dexa) Screening 2/21/2017 Pneumococcal 65+ Low/Medium Risk (1 of 2 - PCV13) 2/21/2017 MEDICARE YEARLY EXAM 2/21/2017 Influenza Age 5 to Adult 8/1/2017 Allergies as of 3/19/2018  Review Complete On: 3/19/2018 By: Jeevan Pineda LPN Severity Noted Reaction Type Reactions Pcn [Penicillins]  10/03/2014    Rash Current Immunizations  Never Reviewed No immunizations on file. Not reviewed this visit You Were Diagnosed With   
  
 Codes Comments Essential hypertension    -  Primary ICD-10-CM: I10 
ICD-9-CM: 401.9 History of palpitations     ICD-10-CM: Z87.898 ICD-9-CM: V12.59 Vitals BP Pulse Resp Height(growth percentile) Weight(growth percentile) SpO2  
 132/78 (BP 1 Location: Left arm, BP Patient Position: Sitting) 78 20 5' (1.524 m) 161 lb 9.6 oz (73.3 kg) 94% BMI OB Status Smoking Status 31.56 kg/m2 Postmenopausal Current Every Day Smoker Vitals History BMI and BSA Data Body Mass Index Body Surface Area  
 31.56 kg/m 2 1.76 m 2 Preferred Pharmacy Pharmacy Name Phone [phone] [facility] [place] [phone] Your Updated Medication List  
  
   
 This list is accurate as of 3/19/18  2:21 PM.  Always use your most recent med list.  
  
  
  
  
 * albuterol 90 mcg/actuation inhaler Commonly known as:  PROVENTIL HFA, VENTOLIN HFA, PROAIR HFA Take 2 Puffs by inhalation every four (4) hours as needed for Wheezing or Shortness of Breath. * albuterol 2.5 mg /3 mL (0.083 %) nebulizer solution Commonly known as:  PROVENTIL VENTOLIN  
3 mL by Nebulization route every four (4) hours as needed for Wheezing. ALPRAZolam 0.25 mg tablet Commonly known as:  Krystle Brisk Take  by mouth daily. aspirin delayed-release 81 mg tablet Take  by mouth daily. buPROPion  mg XL tablet Commonly known as:  Beola Lorenzo Take 300 mg by mouth every morning. carvedilol 3.125 mg tablet Commonly known as:  Kash Si Take 1 Tab by mouth two (2) times daily (with meals). hydroCHLOROthiazide 25 mg tablet Commonly known as:  HYDRODIURIL Take 25 mg by mouth daily. ibuprofen 800 mg tablet Commonly known as:  MOTRIN Take 800 mg by mouth as needed. multivitamin tablet Commonly known as:  ONE A DAY Take 1 Tab by mouth daily. potassium chloride SR 10 mEq tablet Commonly known as:  KLOR-CON 10 Take 20 mEq by mouth two (2) times a day. primidone 50 mg tablet Commonly known as: MYSOLINE Take 1 tablet by mouth nightly. simvastatin 20 mg tablet Commonly known as:  ZOCOR Take  by mouth nightly. * Notice: This list has 2 medication(s) that are the same as other medications prescribed for you. Read the directions carefully, and ask your doctor or other care provider to review them with you. We Performed the Following AMB POC EKG ROUTINE W/ 12 LEADS, INTER & REP [84760 CPT(R)] Introducing [facility]! [facility] [employer] introduces [employer] patient portal. Now you can access parts of your medical record, email your doctor's office, and request medication refills online. 1. In your internet browser, go to [email or web address] 2. Click on the First Time User? Click Here link in the Sign In box. You will see the New Member Sign Up page. 3. Enter your [employer] Access Code exactly as it appears below. You will not need to use this code after youve completed the sign-up process. If you do not sign up before the expiration date, you must request a new code. · [employer] Access Code: [email or web address] Expires: 6/17/2018  2:05 PM 
 
4. Enter the last four digits of your Social Security Number (xxxx) and Date of Birth (mm/dd/yyyy) as indicated and click Submit. You will be taken to the next sign-up page. 5. Create a Monde[employer] ID. This will be your [employer] login ID and cannot be changed, so think of one that is secure and easy to remember. 6. Create a [employer] password. You can change your password at any time. 7. Enter your Password Reset Question and Answer. This can be used at a later time if you forget your password. 8. Enter your e-mail address. You will receive e-mail notification when new information is available in [address] 9. Click Sign Up. You can now view and download portions of your medical record. 10. Click the Download Summary menu link to download a portable copy of your medical information. If you have questions, please visit the Frequently Asked Questions section of the [employer] website. Remember, [employer] is NOT to be used for urgent needs. For medical emergencies, dial 911. Now available from your iPhone and Android! Please provide this summary of care documentation to your next provider. Your primary care clinician is listed as Oxana Marshall. If you have any questions after today's visit, please call [phone].

## 2018-03-19 NOTE — PROGRESS NOTES
Joel Núñez MD    [id number] [place], [address] [facility]    Office [phone],KF [phone]  Pager [phone]    Chelo Valentine is a 79 y.o. female is here for f/u visit. Primary care physician:  Nova Gonzalez MD    Patient Active Problem List   Diagnosis Code    Aphasia R47.01    Essential and other specified forms of tremor G25.0, G25.2       Chief complaint:  Chief Complaint   Patient presents with    Hypertension     C/o fatigue    Irregular Heart Beat     palpitations       Assessment:  Palpitations  Dyspnea on exertion   Atypical chest pain  HTN - BP elevated  Smoker-she had quit for 5 months recently but restarted again. Plan:      Patient's palpitations are improved after starting the Coreg. However, over the past few weeks she has noticed that she has more palpitations intermittently. Stress nuclear study-continue Coreg for stress test.  Switch to Lexiscan if she does not attain target heart rate. Increase Coreg to 6.25 mg twice daily. DC hydrochlorothiazide. Advised to quit smoking  F/u 6 weeks    Patient understands the plan. All questions were answered to the patient's satisfaction. Medication Side Effects and Warnings were discussed with patient: yes  Patient Labs were reviewed and or requested:  yes  Patient Past Records were reviewed and or requested: yes    I appreciate the opportunity to be involved in Ms. Landis. See note below for details. Please do not hesitate to contact us with questions or concerns. Joel Núñez MD    Cardiac Testing/ Procedures: A. Cardiac Cath/PCI:    B.ECHO/DARIUS:  5/16/16 Left ventricle: Systolic function was normal. Ejection fraction was  estimated in the range of 55 % to 60 %. There were no regional wall motion  abnormalities. Doppler parameters were consistent with abnormal left  ventricular relaxation (grade 1 diastolic dysfunction). Mitral valve:  There was mild regurgitation. Aortic valve: There was mild to moderate regurgitation. Tricuspid valve: There was mild regurgitation. C.StressNuclear/Stress ECHO/Stress test:     D.Vascular:    E. EP: Event Monitor 4/5- 5/5/16 - SR/PVC ( symptomatic)    F. Miscellaneous:    Subjective:  Raven Varela is a 79 y.o. female who returns for follow up  Visit. Patient's palpitations are improved after starting the Coreg. However, over the past few weeks she has noticed that she has more palpitations intermittently. Patient also has dyspnea on exertion which she states is getting worse. Also has atypical chest tightness. Mild in intensity. Can occur with rest or exertion. No radiation. No diaphoresis. Mild dizziness. No syncope. ROS:  (bold if positive, if negative)             Medications before admission:    Current Outpatient Prescriptions   Medication Sig Dispense    albuterol (PROVENTIL HFA, VENTOLIN HFA, PROAIR HFA) 90 mcg/actuation inhaler Take 2 Puffs by inhalation every four (4) hours as needed for Wheezing or Shortness of Breath. 1 Inhaler    albuterol (PROVENTIL VENTOLIN) 2.5 mg /3 mL (0.083 %) nebulizer solution 3 mL by Nebulization route every four (4) hours as needed for Wheezing. 24 Each    carvedilol (COREG) 3.125 mg tablet Take 1 Tab by mouth two (2) times daily (with meals). 60 Tab    multivitamin (ONE A DAY) tablet Take 1 Tab by mouth daily.  ibuprofen (MOTRIN) 800 mg tablet Take 800 mg by mouth as needed.  primidone (MYSOLINE) 50 mg tablet Take 1 tablet by mouth nightly. 30 tablet    simvastatin (ZOCOR) 20 mg tablet Take  by mouth nightly.  potassium chloride SR (KLOR-CON 10) 10 mEq tablet Take 20 mEq by mouth two (2) times a day.  buPROPion XL (WELLBUTRIN XL) 300 mg XL tablet Take 300 mg by mouth every morning.  hydrochlorothiazide (HYDRODIURIL) 25 mg tablet Take 25 mg by mouth daily.  alprazolam (XANAX) 0.25 mg tablet Take  by mouth daily.      aspirin delayed-release 81 mg tablet Take  by mouth daily. No current facility-administered medications for this visit. Physical Exam:  Visit Vitals    /78 (BP 1 Location: Left arm, BP Patient Position: Sitting)    Pulse 78    Resp 20    Ht 5' (1.524 m)    Wt 161 lb 9.6 oz (73.3 kg)    SpO2 94%    BMI 31.56 kg/m2          Gen: Well-developed, well-nourished, in no acute distress  Neck: Supple,No JVD, No Carotid Bruit,   Resp: No accessory muscle use, Clear breath sounds, No rales or rhonchi  Card: Regular Rate,Rythm,Normal S1, S2, No murmurs, rubs or gallop. No thrills.    Abd:  Soft, non-tender, non-distended,BS+,   MSK: No cyanosis  Skin: No rashes    Neuro: moving all four extremities , follows commands appropriately  Psych:  Good insight, oriented to person, place , alert, Nml Affect  LE: No edema    EKG:       LABS:        Lab Results   Component Value Date/Time    WBC 12.8 (H) 10/16/2017 02:14 AM    HGB 12.5 10/16/2017 02:14 AM    HCT 37.9 10/16/2017 02:14 AM    PLATELET 085 89/37/8031 02:14 AM    MCV 95.7 10/16/2017 02:14 AM     Lab Results   Component Value Date/Time    Sodium 140 10/16/2017 02:14 AM    Potassium 3.2 (L) 10/16/2017 02:14 AM    Chloride 102 10/16/2017 02:14 AM    CO2 29 10/16/2017 02:14 AM    Anion gap 9 10/16/2017 02:14 AM    Glucose 151 (H) 10/16/2017 02:14 AM    BUN 20 10/16/2017 02:14 AM    Creatinine 0.89 10/16/2017 02:14 AM    BUN/Creatinine ratio 22 (H) 10/16/2017 02:14 AM    GFR est AA >60 10/16/2017 02:14 AM    GFR est non-AA >60 10/16/2017 02:14 AM    Calcium 9.0 10/16/2017 02:14 AM       No results found for: APTT  No results found for: INR, PTMR, PTP, PT1, PT2  No components found for: Purnell Snellen, MD

## 2018-04-11 ENCOUNTER — CLINICAL SUPPORT (OUTPATIENT)
Dept: CARDIOLOGY CLINIC | Age: 68
End: 2018-04-11

## 2018-04-11 DIAGNOSIS — R06.09 DOE (DYSPNEA ON EXERTION): ICD-10-CM

## 2018-04-11 DIAGNOSIS — R00.2 PALPITATIONS: ICD-10-CM

## 2018-04-11 DIAGNOSIS — R07.9 CHEST PAIN, UNSPECIFIED TYPE: Primary | ICD-10-CM

## 2018-04-11 RX ORDER — AMINOPHYLLINE 25 MG/ML
125 INJECTION, SOLUTION INTRAVENOUS ONCE
Qty: 5 ML | Refills: 0
Start: 2018-04-11 | End: 2018-04-11

## 2018-04-11 NOTE — PROGRESS NOTES
See scanned report. Dr. Glenna Santana ordered study and Dr. Glenna Santana read study. ID verified per protocol. Explained procedure to patient. Obtaining IV access, radiation exposure, risks and discomforts (for exercise- change to lexiwalk stress). , waiting between injection(s) and obtaining images. All concerns and questions addressed prior to obtaining consent test. Patient came in with headache with got worse during test. Other symptoms with exercise, then Lexiscan included: nausea/dry heaves, dyspnea At 4 minutes in recovery (after Aminophylline 125 mgs IV given), patient without symptoms or complaints voiced. About 10 minutes post test, c/o headache again. Put in dark room to lay down until nuclear imaging done. At 10:00 am am, patient without symptoms.

## 2018-05-15 ENCOUNTER — OFFICE VISIT (OUTPATIENT)
Dept: CARDIOLOGY CLINIC | Age: 68
End: 2018-05-15

## 2018-05-15 VITALS
OXYGEN SATURATION: 96 % | WEIGHT: 161.4 LBS | BODY MASS INDEX: 31.69 KG/M2 | HEIGHT: 60 IN | HEART RATE: 69 BPM | DIASTOLIC BLOOD PRESSURE: 80 MMHG | RESPIRATION RATE: 14 BRPM | SYSTOLIC BLOOD PRESSURE: 130 MMHG

## 2018-05-15 DIAGNOSIS — F17.200 SMOKER: ICD-10-CM

## 2018-05-15 DIAGNOSIS — R07.89 ATYPICAL CHEST PAIN: ICD-10-CM

## 2018-05-15 DIAGNOSIS — I10 ESSENTIAL HYPERTENSION: Primary | ICD-10-CM

## 2018-05-15 DIAGNOSIS — R00.2 PALPITATIONS: ICD-10-CM

## 2018-05-15 RX ORDER — ATORVASTATIN CALCIUM 40 MG/1
40 TABLET, FILM COATED ORAL DAILY
Refills: 3 | COMMUNITY
Start: 2018-04-09

## 2018-05-15 RX ORDER — HYDROCHLOROTHIAZIDE 25 MG/1
25 TABLET ORAL DAILY
Refills: 0 | COMMUNITY
Start: 2018-02-26

## 2018-05-15 RX ORDER — CARVEDILOL 3.12 MG/1
3.12 TABLET ORAL DAILY
Refills: 3 | COMMUNITY
Start: 2018-02-26

## 2018-05-15 NOTE — PROGRESS NOTES
Chief Complaint   Patient presents with    Hypertension     6 wk f/u    Follow-up     1. Have you been to the ER, urgent care clinic since your last visit? Hospitalized since your last visit? No    2. Have you seen or consulted any other health care providers outside of the [facility] since your last visit? Include any pap smears or colon screening.  No     Visit Vitals    /80 (BP 1 Location: Left arm, BP Patient Position: Sitting)    Pulse 69    Resp 14    Ht 5' (1.524 m)    Wt 161 lb 6.4 oz (73.2 kg)    SpO2 96%    BMI 31.52 kg/m2

## 2018-05-15 NOTE — PROGRESS NOTES
Laura Peterson MD    [address] [facility] [place], [address] [facility]    Office [phone],PPZ [phone]  Pager [phone]    Justice Corrales is a 79 y.o. female is here for f/u visit. Primary care physician:  Dulce Maria Del Valle MD    Patient Active Problem List   Diagnosis Code    Aphasia R47.01    Essential and other specified forms of tremor G25.0, G25.2       Chief complaint:  Chief Complaint   Patient presents with    Hypertension     6 wk f/u    Follow-up       Assessment:  Palpitations  Dyspnea on exertion   Atypical chest pain - normal stress nuc 4/2018  HTN - BP elevated  Smoker-she had quit for 5 months recently but restarted again. Plan:      Cont meds ( Back to coreg 3.125mg bid/HCTZ )  Lipids per PCP  Advised to quit smoking  F/u 12 months    Patient understands the plan. All questions were answered to the patient's satisfaction. Medication Side Effects and Warnings were discussed with patient: yes  Patient Labs were reviewed and or requested:  yes  Patient Past Records were reviewed and or requested: yes    I appreciate the opportunity to be involved in Ms. Landis. See note below for details. Please do not hesitate to contact us with questions or concerns. Laura Peterson MD    Cardiac Testing/ Procedures: A. Cardiac Cath/PCI:    B.ECHO/DARIUS:  5/16/16 Left ventricle: Systolic function was normal. Ejection fraction was  estimated in the range of 55 % to 60 %. There were no regional wall motion  abnormalities. Doppler parameters were consistent with abnormal left  ventricular relaxation (grade 1 diastolic dysfunction). Mitral valve: There was mild regurgitation. Aortic valve: There was mild to moderate regurgitation. Tricuspid valve: There was mild regurgitation. C.StressNuclear/Stress ECHO/Stress test: 4/2018 EF 64%/Nml Lisa      D. Vascular:    E. EP: Event Monitor 4/5- 5/5/16 - SR/PVC ( symptomatic)    F. Miscellaneous:    Subjective:  Ne Lazar is a 79 y.o. female who returns for follow up  Visit. Patient  went back to taking Coreg 3.125 mg twice daily because doubling the dose caused her to have sleepiness and headaches. She also restarted hydrochlorothiazide. No Palpitations. Also has atypical chest tightness. Mild in intensity. Can occur with rest or exertion. No radiation. No diaphoresis. Mild dizziness. No syncope. ROS:  (bold if positive, if negative)             Medications before admission:    Current Outpatient Prescriptions   Medication Sig Dispense    atorvastatin (LIPITOR) 40 mg tablet Take 40 mg by mouth daily.  carvedilol (COREG) 3.125 mg tablet Take 3.125 mg by mouth daily.  hydroCHLOROthiazide (HYDRODIURIL) 25 mg tablet Take 25 mg by mouth daily.  carvedilol (COREG) 6.25 mg tablet Take 1 Tab by mouth two (2) times daily (with meals). 62 Tab    albuterol (PROVENTIL HFA, VENTOLIN HFA, PROAIR HFA) 90 mcg/actuation inhaler Take 2 Puffs by inhalation every four (4) hours as needed for Wheezing or Shortness of Breath. 1 Inhaler    albuterol (PROVENTIL VENTOLIN) 2.5 mg /3 mL (0.083 %) nebulizer solution 3 mL by Nebulization route every four (4) hours as needed for Wheezing. 24 Each    multivitamin (ONE A DAY) tablet Take 1 Tab by mouth daily.  ibuprofen (MOTRIN) 800 mg tablet Take 800 mg by mouth as needed.  primidone (MYSOLINE) 50 mg tablet Take 1 tablet by mouth nightly. 30 tablet    potassium chloride SR (KLOR-CON 10) 10 mEq tablet Take 20 mEq by mouth daily.  buPROPion XL (WELLBUTRIN XL) 300 mg XL tablet Take 300 mg by mouth every morning.  alprazolam (XANAX) 0.25 mg tablet Take  by mouth daily.  aspirin delayed-release 81 mg tablet Take  by mouth daily.  simvastatin (ZOCOR) 20 mg tablet Take  by mouth nightly. No current facility-administered medications for this visit.         Physical Exam:  Visit Vitals    /80 (BP 1 Location: Left arm, BP Patient Position: Sitting)    Pulse 69    Resp 14    Ht 5' (1.524 m)    Wt 161 lb 6.4 oz (73.2 kg)    SpO2 96%    BMI 31.52 kg/m2          Gen: Well-developed, well-nourished, in no acute distress  Neck: Supple,No JVD, No Carotid Bruit,   Resp: No accessory muscle use, Clear breath sounds, No rales or rhonchi  Card: Regular Rate,Rythm,Normal S1, S2, No murmurs, rubs or gallop. No thrills.    Abd:  Soft, non-tender, non-distended,BS+,   MSK: No cyanosis  Skin: No rashes    Neuro: moving all four extremities , follows commands appropriately  Psych:  Good insight, oriented to person, place , alert, Nml Affect  LE: No edema    EKG:       LABS:        Lab Results   Component Value Date/Time    WBC 12.8 (H) 10/16/2017 02:14 AM    HGB 12.5 10/16/2017 02:14 AM    HCT 37.9 10/16/2017 02:14 AM    PLATELET 980 43/07/6984 02:14 AM    MCV 95.7 10/16/2017 02:14 AM     Lab Results   Component Value Date/Time    Sodium 140 10/16/2017 02:14 AM    Potassium 3.2 (L) 10/16/2017 02:14 AM    Chloride 102 10/16/2017 02:14 AM    CO2 29 10/16/2017 02:14 AM    Anion gap 9 10/16/2017 02:14 AM    Glucose 151 (H) 10/16/2017 02:14 AM    BUN 20 10/16/2017 02:14 AM    Creatinine 0.89 10/16/2017 02:14 AM    BUN/Creatinine ratio 22 (H) 10/16/2017 02:14 AM    GFR est AA >60 10/16/2017 02:14 AM    GFR est non-AA >60 10/16/2017 02:14 AM    Calcium 9.0 10/16/2017 02:14 AM       No results found for: APTT  No results found for: INR, PTMR, PTP, PT1, PT2  No components found for: Maria Teresa Hilliard MD

## 2018-05-15 NOTE — MR AVS SNAPSHOT
[address] [facility] [address] [facility] 
[phone] Patient: Sherryle Hopping MRN: [id number] FNC:[birth year] Visit Information Date & Time Provider Department Dept. Phone Encounter #  
 5/15/2018 10:00 AM Damaris Suarez MD [facility] [place]  [phone] [id number] Your Appointments 5/21/2019 10:20 AM  
ESTABLISHED PATIENT with Damaris Suarez MD  
[facility] ([address]) Appt Note: annual  
 [address] [facility] [address] [facility]  
[address] [place] [address] [address] Upcoming Health Maintenance Date Due Hepatitis C Screening 1950 DTaP/Tdap/Td series (1 - Tdap) 2/21/1973 BREAST CANCER SCRN MAMMOGRAM 2/21/2002 FOBT Q 1 YEAR AGE 50-75 2/21/2002 ZOSTER VACCINE AGE 60> 12/21/2011 GLAUCOMA SCREENING Q2Y 2/21/2017 Bone Densitometry (Dexa) Screening 2/21/2017 Pneumococcal 65+ Low/Medium Risk (1 of 2 - PCV13) 2/21/2017 MEDICARE YEARLY EXAM 3/19/2018 Influenza Age 5 to Adult 8/1/2018 Allergies as of 5/15/2018  Review Complete On: 5/15/2018 By: Shamir Liu LPN Severity Noted Reaction Type Reactions Pcn [Penicillins]  10/03/2014    Rash Current Immunizations  Reviewed on 5/15/2018 No immunizations on file. Reviewed by Shamir Liu LPN on 1/82/5508 at 59:74 AM  
Vitals BP Pulse Resp Height(growth percentile) Weight(growth percentile) SpO2  
 130/80 (BP 1 Location: Left arm, BP Patient Position: Sitting) 69 14 5' (1.524 m) 161 lb 6.4 oz (73.2 kg) 96% BMI OB Status Smoking Status 31.52 kg/m2 Postmenopausal Current Every Day Smoker Vitals History BMI and BSA Data Body Mass Index Body Surface Area  
 31.52 kg/m 2 1.76 m 2 Preferred Pharmacy Pharmacy Name Phone [address] [facility] [place] [phone] Your Updated Medication List  
  
   
This list is accurate as of 5/15/18 11:10 AM.  Always use your most recent med list.  
  
  
  
  
 * albuterol 90 mcg/actuation inhaler Commonly known as:  PROVENTIL HFA, VENTOLIN HFA, PROAIR HFA Take 2 Puffs by inhalation every four (4) hours as needed for Wheezing or Shortness of Breath. * albuterol 2.5 mg /3 mL (0.083 %) nebulizer solution Commonly known as:  PROVENTIL VENTOLIN  
3 mL by Nebulization route every four (4) hours as needed for Wheezing. ALPRAZolam 0.25 mg tablet Commonly known as:  Travon Flack Take  by mouth daily. aspirin delayed-release 81 mg tablet Take  by mouth daily. atorvastatin 40 mg tablet Commonly known as:  LIPITOR Take 40 mg by mouth daily. buPROPion  mg XL tablet Commonly known as:  Kevyn Priestly Take 300 mg by mouth every morning. * carvedilol 3.125 mg tablet Commonly known as:  Pennye Louder Take 3.125 mg by mouth daily. * carvedilol 6.25 mg tablet Commonly known as:  Pennye Louder Take 1 Tab by mouth two (2) times daily (with meals). hydroCHLOROthiazide 25 mg tablet Commonly known as:  HYDRODIURIL Take 25 mg by mouth daily. ibuprofen 800 mg tablet Commonly known as:  MOTRIN Take 800 mg by mouth as needed. multivitamin tablet Commonly known as:  ONE A DAY Take 1 Tab by mouth daily. potassium chloride SR 10 mEq tablet Commonly known as:  KLOR-CON 10 Take 20 mEq by mouth daily. primidone 50 mg tablet Commonly known as: MYSOLINE Take 1 tablet by mouth nightly. simvastatin 20 mg tablet Commonly known as:  ZOCOR Take  by mouth nightly. * Notice: This list has 4 medication(s) that are the same as other medications prescribed for you. Read the directions carefully, and ask your doctor or other care provider to review them with you. Introducing [address]! [facility] introduces [employer] patient portal. Now you can access parts of your medical record, email your doctor's office, and request medication refills online. 1. In your internet browser, go to [email or web address] 2. Click on the First Time User? Click Here link in the Sign In box. You will see the New Member Sign Up page. 3. Enter your Livestream Access Code exactly as it appears below. You will not need to use this code after youve completed the sign-up process. If you do not sign up before the expiration date, you must request a new code. · Livestream Access Code: [email or web address] Expires: 6/17/2018  2:05 PM 
 
4. Enter the last four digits of your Social Security Number (xxxx) and Date of Birth (mm/dd/yyyy) as indicated and click Submit. You will be taken to the next sign-up page. 5. Create a Livestream ID. This will be your Livestream login ID and cannot be changed, so think of one that is secure and easy to remember. 6. Create a Livestream password. You can change your password at any time. 7. Enter your Password Reset Question and Answer. This can be used at a later time if you forget your password. 8. Enter your e-mail address. You will receive e-mail notification when new information is available in [address] 9. Click Sign Up. You can now view and download portions of your medical record. 10. Click the Download Summary menu link to download a portable copy of your medical information. If you have questions, please visit the Frequently Asked Questions section of the Livestream website. Remember, Livestream is NOT to be used for urgent needs. For medical emergencies, dial 911. Now available from your iPhone and Android! Please provide this summary of care documentation to your next provider. Your primary care clinician is listed as Shirin Gomez. If you have any questions after today's visit, please call [phone].

## 2019-04-25 ENCOUNTER — TELEPHONE (OUTPATIENT)
Dept: CARDIOLOGY CLINIC | Age: 69
End: 2019-04-25

## 2019-04-25 NOTE — TELEPHONE ENCOUNTER
Patient recently had a CT which found an aortic aneurysm.  Her pcp at [facility] is supposed to be getting in touch with Dr Luis Miguel Rasmussen to discuss  [phone]

## 2019-04-26 NOTE — TELEPHONE ENCOUNTER
Faxed medical records request to Dr Tamie Kwon with [facility] to obtain a copy of CT report.  Faxed to [phone]

## 2019-04-26 NOTE — TELEPHONE ENCOUNTER
Returned patient's call advised we have sent a fax over requesting the CT report and patient has an appointment scheduled with Dr Danita Logan for 5/24/19 at 2:40 pm. Patient verbalized understanding.

## 2019-05-03 ENCOUNTER — HOSPITAL ENCOUNTER (OUTPATIENT)
Dept: CT IMAGING | Age: 69
Discharge: HOME | End: 2019-05-03
Attending: INTERNAL MEDICINE
Payer: SELF-PAY

## 2019-05-03 ENCOUNTER — OFFICE VISIT (OUTPATIENT)
Dept: CARDIOLOGY CLINIC | Age: 69
End: 2019-05-03

## 2019-05-03 VITALS
RESPIRATION RATE: 18 BRPM | SYSTOLIC BLOOD PRESSURE: 124 MMHG | OXYGEN SATURATION: 96 % | HEART RATE: 70 BPM | BODY MASS INDEX: 29.49 KG/M2 | WEIGHT: 150.2 LBS | DIASTOLIC BLOOD PRESSURE: 82 MMHG | HEIGHT: 60 IN

## 2019-05-03 DIAGNOSIS — Z00.00 PREVENTATIVE HEALTH CARE: ICD-10-CM

## 2019-05-03 DIAGNOSIS — I10 ESSENTIAL HYPERTENSION: Primary | ICD-10-CM

## 2019-05-03 DIAGNOSIS — R00.2 PALPITATIONS: ICD-10-CM

## 2019-05-03 DIAGNOSIS — F17.200 SMOKER: ICD-10-CM

## 2019-05-03 PROCEDURE — 75571 CT HRT W/O DYE W/CA TEST: CPT

## 2019-05-03 NOTE — PROGRESS NOTES
Hakan España MD 
 
[id number] [place], [address] [facility] Office [phone] Pager [phone] Bhupinder Malagon is a 81 y.o. female is here for f/u visit. Primary care physician: 
Henrik Berrios MD 
 
Patient Active Problem List  
Diagnosis Code  Aphasia R47.01  
 Essential and other specified forms of tremor G25.0, G25.2 Chief complaint: 
Chief Complaint Patient presents with  Follow-up F/u to CT Dear Dr Colt Youngblood, 
 
I had the pleasure of seeing Ms Bhupinder Malagon  in the office today. Assessment: 
 
Recent CT chest/abdomen for abdominal pain-diagnosed to have diverticulitis. Also incidental finding of a small abdominal aortic aneurysm and possibly calcium buildup in her coronaries. Report not available. History of palpitations - intermitent Chronic dyspnea on exertion HTN - BP elevated Smoker- 
 
 
Plan:   
 
Will obtain the CT report. Will schedule for calcium score. If calcium score is significantly elevated then will consider pursuing another stress test. 
After confirming the size of the aortic aneurysm from a CT scan-if small would require yearly CT scans. Cont meds ( Back to coreg 3.125mg bid/HCTZ ) Lipids per PCP reviewed lab 5/25/2018-LDL 98, , TG-213 Advised to quit smoking completely F/u 12 months or earlier based on Ca score. Patient understands the plan. All questions were answered to the patient's satisfaction. Medication Side Effects and Warnings were discussed with patient: yes Patient Labs were reviewed and or requested:  yes Patient Past Records were reviewed and or requested: yes I appreciate the opportunity to be involved in Ms. Landis. See note below for details. Please do not hesitate to contact us with questions or concerns.  
 
ATTENTION:  
This medical record was transcribed using an electronic medical records/speech recognition system. Although proofread, it may and can contain electronic, spelling and other errors. Corrections may be executed at a later time. Please feel free to contact us for any clarifications as needed. Add CT abdomen pelvis 4/17/2019-atherosclerotic infrarenal AAA (3.2 cm)-focal right posterior lateral wall protrusion noted but possibly covered by intra-abdominal thrombus Carlos A Sanders MD 
 
Cardiac Testing/ Procedures: A. Cardiac Cath/PCI: 
 
B.ECHO/DARIUS:  5/16/16 Left ventricle: Systolic function was normal. Ejection fraction was 
estimated in the range of 55 % to 60 %. There were no regional wall motion 
abnormalities. Doppler parameters were consistent with abnormal left 
ventricular relaxation (grade 1 diastolic dysfunction). Mitral valve: There was mild regurgitation. Aortic valve: There was mild to moderate regurgitation. Tricuspid valve: There was mild regurgitation. C.StressNuclear/Stress ECHO/Stress test: 4/2018 EF 64%/Nml Lisa D. Vascular:CT abdomen pelvis 4/17/2019-atherosclerotic infrarenal AAA (3.2 cm)-focal right posterior lateral wall protrusion noted but possibly covered by intra-abdominal  
 
E. EP: Event Monitor 4/5- 5/5/16 - SR/PVC ( symptomatic) F. Miscellaneous: 
 
Subjective: 
Sea Levy is a 81 y.o. female who returns for follow up  Visit. Recently had a CT scan done as part of her diverticulitis work-up and was told that she had coronary artery disease and possibly an aortic aneurysm and had to follow-up with cardiology. .No Palpitations. Also has atypical chest tightness. Mild in intensity. Can occur with rest or exertion. No radiation. No diaphoresis. Mild dizziness. No syncope. I do not have the report of the CT scan. Patient played a recording on her phone left by her PCP-Dr. Asad Damian. It appears that she has a small abdominal aortic aneurysm.   They also noted that she had some calcium buildup in her coronaries. ROS: 
(bold if positive, if negative) Medications before admission: 
 
Current Outpatient Medications Medication Sig Dispense  carvedilol (COREG) 3.125 mg tablet Take 3.125 mg by mouth daily.  hydroCHLOROthiazide (HYDRODIURIL) 25 mg tablet Take 25 mg by mouth daily.  carvedilol (COREG) 6.25 mg tablet Take 1 Tab by mouth two (2) times daily (with meals). (Patient taking differently: Take 3.125 mg by mouth two (2) times daily (with meals). ) 62 Tab  albuterol (PROVENTIL HFA, VENTOLIN HFA, PROAIR HFA) 90 mcg/actuation inhaler Take 2 Puffs by inhalation every four (4) hours as needed for Wheezing or Shortness of Breath. 1 Inhaler  albuterol (PROVENTIL VENTOLIN) 2.5 mg /3 mL (0.083 %) nebulizer solution 3 mL by Nebulization route every four (4) hours as needed for Wheezing. 24 Each  multivitamin (ONE A DAY) tablet Take 1 Tab by mouth daily.  ibuprofen (MOTRIN) 800 mg tablet Take 800 mg by mouth as needed.  primidone (MYSOLINE) 50 mg tablet Take 1 tablet by mouth nightly. 30 tablet  simvastatin (ZOCOR) 20 mg tablet Take  by mouth nightly.  potassium chloride SR (KLOR-CON 10) 10 mEq tablet Take 20 mEq by mouth daily.  buPROPion XL (WELLBUTRIN XL) 300 mg XL tablet Take 300 mg by mouth every morning.  alprazolam (XANAX) 0.25 mg tablet Take 0.25 mg by mouth two (2) times daily as needed.  aspirin delayed-release 81 mg tablet Take  by mouth daily.  atorvastatin (LIPITOR) 40 mg tablet Take 40 mg by mouth daily. No current facility-administered medications for this visit. Physical Exam: 
Visit Vitals /82 (BP 1 Location: Left arm, BP Patient Position: Sitting) Pulse 70 Resp 18 Ht 5' (1.524 m) Wt 150 lb 3.2 oz (68.1 kg) SpO2 96% BMI 29.33 kg/m² Gen: Well-developed, well-nourished, in no acute distress Neck: Supple,No JVD, No Carotid Bruit, Resp: No accessory muscle use, Clear breath sounds, No rales or rhonchi 
Card: Regular Rate,Rythm,Normal S1, S2, No murmurs, rubs or gallop. No thrills. Abd:  Soft, non-tender, non-distended,BS+,  
MSK: No cyanosis Skin: No rashes Neuro: moving all four extremities , follows commands appropriately Psych:  Good insight, oriented to person, place , alert, Nml Affect LE: No edema EKG: Sinus rhythm, NSTT cannot rule out IMI-age undetermined LABS: 
 
 
 
Lab Results Component Value Date/Time WBC 12.8 (H) 10/16/2017 02:14 AM  
 HGB 12.5 10/16/2017 02:14 AM  
 HCT 37.9 10/16/2017 02:14 AM  
 PLATELET 730 09/65/1112 02:14 AM  
 MCV 95.7 10/16/2017 02:14 AM  
 
Lab Results Component Value Date/Time Sodium 140 10/16/2017 02:14 AM  
 Potassium 3.2 (L) 10/16/2017 02:14 AM  
 Chloride 102 10/16/2017 02:14 AM  
 CO2 29 10/16/2017 02:14 AM  
 Anion gap 9 10/16/2017 02:14 AM  
 Glucose 151 (H) 10/16/2017 02:14 AM  
 BUN 20 10/16/2017 02:14 AM  
 Creatinine 0.89 10/16/2017 02:14 AM  
 BUN/Creatinine ratio 22 (H) 10/16/2017 02:14 AM  
 GFR est AA >60 10/16/2017 02:14 AM  
 GFR est non-AA >60 10/16/2017 02:14 AM  
 Calcium 9.0 10/16/2017 02:14 AM  
 
 
No results found for: APTT No results found for: INR, PTMR, PTP, PT1, PT2 No components found for: LAST LIPIDS Sameer Santiago MD

## 2019-05-03 NOTE — PROGRESS NOTES
Ernst Elder is a 81 y.o. female Chief Complaint Patient presents with  Follow-up F/u to CT Chest pain Pt denied SOB Pt denied Dizziness Pt denied Swelling Pt states he feet swell just a little bit. Recent hospital visit NO Refills NO Visit Vitals /82 (BP 1 Location: Left arm, BP Patient Position: Sitting) Pulse 70 Resp 18 Ht 5' (1.524 m) Wt 150 lb 3.2 oz (68.1 kg) SpO2 96% BMI 29.33 kg/m² 1. Have you been to the ER, urgent care clinic since your last visit? Hospitalized since your last visit? NO 
 
2. Have you seen or consulted any other health care providers outside of the [address] [place] [address] since your last visit? Include any pap smears or colon screening.   PCP

## 2019-05-09 ENCOUNTER — TELEPHONE (OUTPATIENT)
Dept: CARDIOLOGY CLINIC | Age: 69
End: 2019-05-09

## 2019-05-10 NOTE — CARDIO/PULMONARY
Reached patient at her given mobile number and shared her coronary artery CT score of 160 with her. I also shared that the majority of this score was in a single vessel. We discussed the meaning of this score. Patient has no further questions at this time. Patient plans to follow up with Dr. Veronique Ramos.

## 2020-05-05 ENCOUNTER — VIRTUAL VISIT (OUTPATIENT)
Dept: CARDIOLOGY CLINIC | Age: 70
End: 2020-05-05

## 2020-05-05 DIAGNOSIS — I71.40 ABDOMINAL AORTIC ANEURYSM (AAA) 3.0 CM TO 5.0 CM IN DIAMETER IN FEMALE: ICD-10-CM

## 2020-05-05 DIAGNOSIS — I10 ESSENTIAL HYPERTENSION: ICD-10-CM

## 2020-05-05 DIAGNOSIS — I25.10 CORONARY ARTERY DISEASE INVOLVING NATIVE CORONARY ARTERY OF NATIVE HEART WITHOUT ANGINA PECTORIS: Primary | ICD-10-CM

## 2020-05-05 DIAGNOSIS — F17.200 SMOKER: ICD-10-CM

## 2020-05-05 RX ORDER — PROPRANOLOL HYDROCHLORIDE 20 MG/1
20 TABLET ORAL DAILY
COMMUNITY
Start: 2020-04-21

## 2020-05-05 NOTE — PROGRESS NOTES
Tina Randall MD    [address] [place], [address] [facility]    Office [phone],[facility] [phone]  Pager [phone] to the emergency declaration under the [facility] Critical access [facility] waiver authority and the [employer] Act, this Telephone Visit was conducted, with patient's consent, to reduce the patient's risk of exposure to COVID-19 and provide continuity of care for an established patient. He and/or health care decision maker is aware that that he may receive a bill for this telephone service, depending on his insurance coverage, and has provided verbal consent to proceed. This is a Patient Initiated Episode with an Established Patient who has not had a related appointment within my department in the past 7 days or scheduled within the next 24 hours. Primary care physician:  Ian Marino MD    Patient Active Problem List   Diagnosis Code    Aphasia R47.01    Essential and other specified forms of tremor G25.0, G25.2       Chief complaint:  No chief complaint on file. Assessment:    CAD-abnormal calcium score-160 (left main/LAD) 5/2019 4/70/19 CT chest/abdomen for abdominal pain-diagnosed to have diverticulitis-calcification left aortic cusp, left main artery/coronaries; Infrarenal AAA-3.2 cm. There is a focal deformity on the right lateral wall of the aorta at the aneurysm, extending towards the IVC but there is a crescent of intraluminal thrombus which overlies the area of focal wall of protrusion   History of palpitations - intermitent  HTN   Smoker-      Plan:    Advised that she needs to check her blood pressures and maintain a log.   Went over her calcium score-CT report/CTA abdominal pelvis 4/2019   Cont meds ( Back to coreg 3.125mg bid/HCTZ )  Lipids per PCP target LDL less than 70  Advised to quit smoking completely  F/u 4 months or earlier as needed    Total time 15 min  Patient understands the plan. All questions were answered to the patient's satisfaction. Medication Side Effects and Warnings were discussed with patient: yes  Patient Labs were reviewed and or requested:  yes  Patient Past Records were reviewed and or requested: yes    I appreciate the opportunity to be involved in Ms. Landis. See note below for details. Please do not hesitate to contact us with questions or concerns. ATTENTION:   This medical record was transcribed using an electronic medical records/speech recognition system. Although proofread, it may and can contain electronic, spelling and other errors. Corrections may be executed at a later time. Please feel free to contact us for any clarifications as needed. Add CT abdomen pelvis 4/17/2019-atherosclerotic infrarenal AAA (3.2 cm)-focal right posterior lateral wall protrusion noted but possibly covered by intra-abdominal thrombus    Mary Ochoa MD    Cardiac Testing/ Procedures: A. Cardiac Cath/PCI:    B.ECHO/DARIUS:  5/16/16 Left ventricle: Systolic function was normal. Ejection fraction was  estimated in the range of 55 % to 60 %. There were no regional wall motion  abnormalities. Doppler parameters were consistent with abnormal left  ventricular relaxation (grade 1 diastolic dysfunction). Mitral valve: There was mild regurgitation. Aortic valve: There was mild to moderate regurgitation. Tricuspid valve: There was mild regurgitation. C.StressNuclear/Stress ECHO/Stress test: 4/2018 EF 64%/Nml Lisa      D. Vascular:CT abdomen pelvis 4/17/2019-atherosclerotic infrarenal AAA (3.2 cm)-focal right posterior lateral wall protrusion noted but possibly covered by intra-abdominal     E. EP: Event Monitor 4/5- 5/5/16 - SR/PVC ( symptomatic)    F.  Miscellaneous: 4/70/19-outside hospital CT chest/abdomen-calcification left aortic cusp, left main artery/coronaries; Infrarenal AAA-3.2 cm. There is a focal deformity on the right lateral wall of the aorta at the aneurysm, extending towards the IVC but there is a crescent of intraluminal thrombus which overlies the area of focal wall of protrusion    CT-calcium score. 5/3/2019-total score 160. Left main-105, LAD-40, left circumflex 15, RCA 0        Subjective:  Sinan Souza is a 78 y.o. female who returns for follow up  Visit. Patient states that she has been doing well. No chest pain, abdominal pain, nausea, vomiting, shortness of breath, swelling lower extremities. Does not check her blood pressures. ROS:  (bold if positive, if negative)             Medications before admission:    Current Outpatient Medications   Medication Sig Dispense    propranoloL (INDERAL) 20 mg tablet 20 mg daily.  atorvastatin (LIPITOR) 40 mg tablet Take 40 mg by mouth daily.  hydroCHLOROthiazide (HYDRODIURIL) 25 mg tablet Take 25 mg by mouth daily.  albuterol (PROVENTIL HFA, VENTOLIN HFA, PROAIR HFA) 90 mcg/actuation inhaler Take 2 Puffs by inhalation every four (4) hours as needed for Wheezing or Shortness of Breath. 1 Inhaler    albuterol (PROVENTIL VENTOLIN) 2.5 mg /3 mL (0.083 %) nebulizer solution 3 mL by Nebulization route every four (4) hours as needed for Wheezing. 24 Each    multivitamin (ONE A DAY) tablet Take 1 Tab by mouth daily.  ibuprofen (MOTRIN) 800 mg tablet Take 800 mg by mouth as needed.  primidone (MYSOLINE) 50 mg tablet Take 1 tablet by mouth nightly. 30 tablet    potassium chloride SR (KLOR-CON 10) 10 mEq tablet Take 20 mEq by mouth daily.  buPROPion XL (WELLBUTRIN XL) 300 mg XL tablet Take 300 mg by mouth every morning.  alprazolam (XANAX) 0.25 mg tablet Take 0.25 mg by mouth two (2) times daily as needed.  aspirin delayed-release 81 mg tablet Take  by mouth daily.  carvedilol (COREG) 3.125 mg tablet Take 3.125 mg by mouth daily.      carvedilol (COREG) 6.25 mg tablet Take 1 Tab by mouth two (2) times daily (with meals). (Patient not taking: Reported on 5/5/2020) 62 Tab    simvastatin (ZOCOR) 20 mg tablet Take  by mouth nightly. No current facility-administered medications for this visit. Physical Exam:  There were no vitals taken for this visit.            EKG:       LABS:        Lab Results   Component Value Date/Time    WBC 12.8 (H) 10/16/2017 02:14 AM    HGB 12.5 10/16/2017 02:14 AM    HCT 37.9 10/16/2017 02:14 AM    PLATELET 763 51/15/7143 02:14 AM    MCV 95.7 10/16/2017 02:14 AM     Lab Results   Component Value Date/Time    Sodium 140 10/16/2017 02:14 AM    Potassium 3.2 (L) 10/16/2017 02:14 AM    Chloride 102 10/16/2017 02:14 AM    CO2 29 10/16/2017 02:14 AM    Anion gap 9 10/16/2017 02:14 AM    Glucose 151 (H) 10/16/2017 02:14 AM    BUN 20 10/16/2017 02:14 AM    Creatinine 0.89 10/16/2017 02:14 AM    BUN/Creatinine ratio 22 (H) 10/16/2017 02:14 AM    GFR est AA >60 10/16/2017 02:14 AM    GFR est non-AA >60 10/16/2017 02:14 AM    Calcium 9.0 10/16/2017 02:14 AM       No results found for: APTT  No results found for: INR, PTMR, PTP, PT1, PT2, INREXT, INREXT  No components found for: Sarabjit Aj MD

## 2020-05-12 ENCOUNTER — TELEPHONE (OUTPATIENT)
Dept: CARDIOLOGY CLINIC | Age: 70
End: 2020-05-12

## 2020-05-12 NOTE — TELEPHONE ENCOUNTER
Patient informing oh her BP readings she took for 8 days.      5/5- 133/84  5/6- 120/104  5/7- 133/88  5/8- 123/82  5/9- 137/79  5/10- 133/84  5/11- 148/99  5/12- 130/83       Phone: [phone]

## 2020-05-13 NOTE — TELEPHONE ENCOUNTER
See below BP readings from patient. Per your VV on 5/5/20 you advised her to maintain a log. Please advise.

## 2020-05-13 NOTE — TELEPHONE ENCOUNTER
Called patient advised per Dr Yenny Escobedo  BP looks good for most part. Continue to monitor - maybe once or twice a week. Patient verbalized understanding.

## 2020-08-25 ENCOUNTER — OFFICE VISIT (OUTPATIENT)
Dept: CARDIOLOGY CLINIC | Age: 70
End: 2020-08-25
Payer: MEDICARE

## 2020-08-25 VITALS
BODY MASS INDEX: 30.55 KG/M2 | HEIGHT: 62 IN | DIASTOLIC BLOOD PRESSURE: 76 MMHG | SYSTOLIC BLOOD PRESSURE: 132 MMHG | WEIGHT: 166 LBS

## 2020-08-25 DIAGNOSIS — I10 ESSENTIAL HYPERTENSION: Primary | ICD-10-CM

## 2020-08-25 DIAGNOSIS — I71.40 ABDOMINAL AORTIC ANEURYSM (AAA) WITHOUT RUPTURE: ICD-10-CM

## 2020-08-25 DIAGNOSIS — M79.89 SWELLING OF LEFT LOWER EXTREMITY: ICD-10-CM

## 2020-08-25 PROCEDURE — G0463 HOSPITAL OUTPT CLINIC VISIT: HCPCS | Performed by: INTERNAL MEDICINE

## 2020-08-25 PROCEDURE — G8754 DIAS BP LESS 90: HCPCS | Performed by: INTERNAL MEDICINE

## 2020-08-25 PROCEDURE — 3017F COLORECTAL CA SCREEN DOC REV: CPT | Performed by: INTERNAL MEDICINE

## 2020-08-25 PROCEDURE — G8400 PT W/DXA NO RESULTS DOC: HCPCS | Performed by: INTERNAL MEDICINE

## 2020-08-25 PROCEDURE — G8427 DOCREV CUR MEDS BY ELIG CLIN: HCPCS | Performed by: INTERNAL MEDICINE

## 2020-08-25 PROCEDURE — 99214 OFFICE O/P EST MOD 30 MIN: CPT | Performed by: INTERNAL MEDICINE

## 2020-08-25 PROCEDURE — G8432 DEP SCR NOT DOC, RNG: HCPCS | Performed by: INTERNAL MEDICINE

## 2020-08-25 PROCEDURE — 1101F PT FALLS ASSESS-DOCD LE1/YR: CPT | Performed by: INTERNAL MEDICINE

## 2020-08-25 PROCEDURE — G8417 CALC BMI ABV UP PARAM F/U: HCPCS | Performed by: INTERNAL MEDICINE

## 2020-08-25 PROCEDURE — G8752 SYS BP LESS 140: HCPCS | Performed by: INTERNAL MEDICINE

## 2020-08-25 PROCEDURE — 1090F PRES/ABSN URINE INCON ASSESS: CPT | Performed by: INTERNAL MEDICINE

## 2020-08-25 PROCEDURE — G8536 NO DOC ELDER MAL SCRN: HCPCS | Performed by: INTERNAL MEDICINE

## 2020-08-25 RX ORDER — MELATONIN
DAILY
COMMUNITY

## 2020-08-25 NOTE — PROGRESS NOTES
Silvia Coffey is a 78 y.o. female  Chief Complaint   Patient presents with    Coronary Artery Disease    Hypertension    Abdominal Aortic Aneurysm     Health Maintenance Due   Topic Date Due    Hepatitis C Screening  1950    Lipid Screen  02/21/1962    DTaP/Tdap/Td series (1 - Tdap) 02/21/1973    Shingrix Vaccine Age 50> (1 of 2) 02/21/2002    Breast Cancer Screen Mammogram  02/21/2002    FOBT Q1Y Age 50-75  02/21/2002    GLAUCOMA SCREENING Q2Y  02/21/2017    Bone Densitometry (Dexa) Screening  02/21/2017    Pneumococcal 65+ years (1 of 1 - PPSV23) 02/21/2017    Medicare Yearly Exam  03/19/2018     Visit Vitals  /76 (BP 1 Location: Left arm, BP Patient Position: Sitting)   Ht 5' 2\" (1.575 m)   Wt 166 lb (75.3 kg)   BMI 30.36 kg/m²     1. Have you been to the ER, urgent care clinic since your last visit? Hospitalized since your last visit? No     2. Have you seen or consulted any other health care providers outside of the [address] [place] [address] since your last visit? Include any pap smears or colon screening.  No

## 2020-09-10 ENCOUNTER — ANCILLARY PROCEDURE (OUTPATIENT)
Dept: CARDIOLOGY CLINIC | Age: 70
End: 2020-09-10
Payer: MEDICARE

## 2020-09-10 VITALS — HEIGHT: 62 IN | BODY MASS INDEX: 30.55 KG/M2 | WEIGHT: 166 LBS

## 2020-09-10 DIAGNOSIS — R60.0 LEG EDEMA, LEFT: ICD-10-CM

## 2020-09-10 PROCEDURE — 93971 EXTREMITY STUDY: CPT | Performed by: INTERNAL MEDICINE

## 2020-09-11 ENCOUNTER — HOSPITAL ENCOUNTER (OUTPATIENT)
Dept: CT IMAGING | Age: 70
Discharge: HOME | End: 2020-09-11
Attending: INTERNAL MEDICINE
Payer: MEDICARE

## 2020-09-11 DIAGNOSIS — I71.40 AAA (ABDOMINAL AORTIC ANEURYSM) WITHOUT RUPTURE: ICD-10-CM

## 2020-09-11 DIAGNOSIS — I71.40 AAA (ABDOMINAL AORTIC ANEURYSM) WITHOUT RUPTURE: Primary | ICD-10-CM

## 2020-09-11 DIAGNOSIS — I10 ESSENTIAL HYPERTENSION: ICD-10-CM

## 2020-09-11 DIAGNOSIS — I71.40 ABDOMINAL AORTIC ANEURYSM (AAA) WITHOUT RUPTURE: ICD-10-CM

## 2020-09-11 LAB — CREAT BLD-MCNC: 0.9 MG/DL (ref 0.6–1.3)

## 2020-09-11 PROCEDURE — 74011000636 HC RX REV CODE- 636: Performed by: INTERNAL MEDICINE

## 2020-09-11 PROCEDURE — 74174 CTA ABD&PLVS W/CONTRAST: CPT

## 2020-09-11 PROCEDURE — 82565 ASSAY OF CREATININE: CPT

## 2020-09-11 RX ADMIN — IOPAMIDOL 100 ML: 755 INJECTION, SOLUTION INTRAVENOUS at 13:15

## 2020-09-15 ENCOUNTER — TELEPHONE (OUTPATIENT)
Dept: CARDIOLOGY CLINIC | Age: 70
End: 2020-09-15

## 2020-09-15 NOTE — TELEPHONE ENCOUNTER
Called patient ID verified X2 reviewed below results per Dr Ernie Mcwilliams. Leg ultrasound normal -no clot. Patient verbalized understanding.        ----- Message from Lee Ochoa MD sent at 9/11/2020  7:40 PM EDT -----  Leg ultrasound normal -no clot.

## 2020-09-17 ENCOUNTER — DOCUMENTATION ONLY (OUTPATIENT)
Dept: CARDIOLOGY CLINIC | Age: 70
End: 2020-09-17

## 2020-09-17 NOTE — PROGRESS NOTES
CTA  9/11/20 - AAA 4 cm    4/2019 - CT abd aorta - AAA 3.2 cm ( in CC) . ML for pt    Has f/up 2/2021 - Will re-evaluate    [employer].  MD, [facility]

## 2023-07-04 NOTE — PROGRESS NOTES
Doyle Pineda MD    [address] [place], [address] [facility]    Office [phone],[facility] [phone]      Juni Trevizo is a 78 y.o. female is here for f/u visit. Primary care physician:  Abel Pearce MD    Patient Active Problem List   Diagnosis Code    Aphasia R47.01    Essential and other specified forms of tremor G25.0, G25.2       Chief complaint:  Chief Complaint   Patient presents with    Coronary Artery Disease    Hypertension    Abdominal Aortic Aneurysm       Dear Dr Supriya Fournier,    I had the pleasure of seeing Ms Juni Trevizo  in the office today. Assessment:    History of palpitations - intermitent  Chronic dyspnea on exertion   HTN - BP elevated  Smoker-  Left lower lower extremity swelling-could be secondary to venous insufficiency. AAA - 3.2 - CTA 4/2019    Plan:      4/2019 - CT abd aorta - AAA 3.2 cm ( in CC) . Will schedule another CT of the abdominal aorta to reevaluate AAA. Left lower extremity duplex ultrasound to rule out DVT  Cont meds  Lipids per PCP  Advised to quit smoking completely  F/u 12 months or earlier prn    Patient understands the plan. All questions were answered to the patient's satisfaction. Medication Side Effects and Warnings were discussed with patient: yes  Patient Labs were reviewed and or requested:  yes  Patient Past Records were reviewed and or requested: yes    I appreciate the opportunity to be involved in Ms. Landis. See note below for details. Please do not hesitate to contact us with questions or concerns. ATTENTION:   This medical record was transcribed using an electronic medical records/speech recognition system. Although proofread, it may and can contain electronic, spelling and other errors. Corrections may be executed at a later time. Please feel free to contact us for any clarifications as needed.     Add CT abdomen pelvis 4/17/2019-atherosclerotic infrarenal AAA (3.2 cm)-focal right posterior lateral wall protrusion noted but possibly covered by intra-abdominal thrombus    Ana Maria Nair MD    Cardiac Testing/ Procedures: A. Cardiac Cath/PCI:    B.ECHO/DARIUS:  5/16/16 Left ventricle: Systolic function was normal. Ejection fraction was  estimated in the range of 55 % to 60 %. There were no regional wall motion  abnormalities. Doppler parameters were consistent with abnormal left  ventricular relaxation (grade 1 diastolic dysfunction). Mitral valve: There was mild regurgitation. Aortic valve: There was mild to moderate regurgitation. Tricuspid valve: There was mild regurgitation. C.StressNuclear/Stress ECHO/Stress test: 4/2018 EF 64%/Nml Lisa      D. Vascular:CT abdomen pelvis 4/17/2019-atherosclerotic infrarenal AAA (3.2 cm)-focal right posterior lateral wall protrusion noted but possibly covered by intra-abdominal   5/2019 Total calcium score of 160. Moderate evidence of plaque. Moderate non-obstructive coronary artery disease is highly likely. E. EP: Event Monitor 4/5- 5/5/16 - SR/PVC ( symptomatic)    F. Miscellaneous:    Subjective:  Karen Hanks is a 78 y.o. female who returns for follow up  Visit. No Palpitations. No CP. Mild chronic TABARES. No diaphoresis. Mild dizziness. No syncope. Has noticed swelling in the left lower extremity over the past few weeks. Usually occurs when she has been on her feet. However she has not noticed swelling in the right lower extremity. No history of trauma. ROS:  (bold if positive, if negative)             Medications before admission:    Current Outpatient Medications   Medication Sig Dispense    cholecalciferol (VITAMIN D3) (1000 Units /25 mcg) tablet Take  by mouth daily.  propranoloL (INDERAL) 20 mg tablet 20 mg daily.  atorvastatin (LIPITOR) 40 mg tablet Take 40 mg by mouth daily.  hydroCHLOROthiazide (HYDRODIURIL) 25 mg tablet Take 25 mg by mouth daily.      albuterol (PROVENTIL HFA, VENTOLIN HFA, PROAIR HFA) 90 mcg/actuation inhaler Take 2 Puffs by inhalation every four (4) hours as needed for Wheezing or Shortness of Breath. 1 Inhaler    albuterol (PROVENTIL VENTOLIN) 2.5 mg /3 mL (0.083 %) nebulizer solution 3 mL by Nebulization route every four (4) hours as needed for Wheezing. 24 Each    multivitamin (ONE A DAY) tablet Take 1 Tab by mouth daily.  primidone (MYSOLINE) 50 mg tablet Take 1 tablet by mouth nightly. 30 tablet    potassium chloride SR (KLOR-CON 10) 10 mEq tablet Take 20 mEq by mouth daily.  buPROPion XL (WELLBUTRIN XL) 300 mg XL tablet Take 300 mg by mouth every morning.  alprazolam (XANAX) 0.25 mg tablet Take 0.25 mg by mouth two (2) times daily as needed.  aspirin delayed-release 81 mg tablet Take  by mouth daily.  carvedilol (COREG) 3.125 mg tablet Take 3.125 mg by mouth daily.  carvedilol (COREG) 6.25 mg tablet Take 1 Tab by mouth two (2) times daily (with meals). (Patient not taking: Reported on 5/5/2020) 62 Tab    ibuprofen (MOTRIN) 800 mg tablet Take 800 mg by mouth as needed.  simvastatin (ZOCOR) 20 mg tablet Take  by mouth nightly. No current facility-administered medications for this visit. Physical Exam:  Visit Vitals  /76 (BP 1 Location: Left arm, BP Patient Position: Sitting)   Ht 5' 2\" (1.575 m)   Wt 166 lb (75.3 kg)   BMI 30.36 kg/m²          Gen: Well-developed, well-nourished, in no acute distress  Neck: Supple,No JVD, No Carotid Bruit,   Resp: No accessory muscle use, Clear breath sounds, No rales or rhonchi  Card: Regular Rate,Rythm,Normal S1, S2, No murmurs, rubs or gallop. No thrills.    Abd:  Soft, non-tender, non-distended,BS+,   MSK: No cyanosis  Skin: No rashes    Neuro: moving all four extremities , follows commands appropriately  Psych:  Good insight, oriented to person, place , alert, Nml Affect  LE: No edema    EKG:       LABS:        Lab Results   Component Value Date/Time    WBC 12.8 (H) 10/16/2017 02:14 AM HGB 12.5 10/16/2017 02:14 AM    HCT 37.9 10/16/2017 02:14 AM    PLATELET 361 51/26/7940 02:14 AM    MCV 95.7 10/16/2017 02:14 AM     Lab Results   Component Value Date/Time    Sodium 140 10/16/2017 02:14 AM    Potassium 3.2 (L) 10/16/2017 02:14 AM    Chloride 102 10/16/2017 02:14 AM    CO2 29 10/16/2017 02:14 AM    Anion gap 9 10/16/2017 02:14 AM    Glucose 151 (H) 10/16/2017 02:14 AM    BUN 20 10/16/2017 02:14 AM    Creatinine 0.89 10/16/2017 02:14 AM    BUN/Creatinine ratio 22 (H) 10/16/2017 02:14 AM    GFR est AA >60 10/16/2017 02:14 AM    GFR est non-AA >60 10/16/2017 02:14 AM    Calcium 9.0 10/16/2017 02:14 AM       No results found for: APTT  No results found for: INR, PTMR, PTP, PT1, PT2, INREXT, INREXT  No components found for: Maria Isabel Patel MD No

## 2023-07-06 ENCOUNTER — HOSPITAL ENCOUNTER (EMERGENCY)
Facility: HOSPITAL | Age: 73
Discharge: HOME | End: 2023-07-06
Attending: STUDENT IN AN ORGANIZED HEALTH CARE EDUCATION/TRAINING PROGRAM
Payer: MEDICARE

## 2023-07-06 ENCOUNTER — APPOINTMENT (OUTPATIENT)
Facility: HOSPITAL | Age: 73
End: 2023-07-06
Payer: MEDICARE

## 2023-07-06 VITALS
HEIGHT: 62 IN | BODY MASS INDEX: 26.37 KG/M2 | DIASTOLIC BLOOD PRESSURE: 63 MMHG | RESPIRATION RATE: 14 BRPM | TEMPERATURE: 98.5 F | WEIGHT: 143.3 LBS | SYSTOLIC BLOOD PRESSURE: 115 MMHG | HEART RATE: 65 BPM | OXYGEN SATURATION: 96 %

## 2023-07-06 DIAGNOSIS — K43.9 VENTRAL HERNIA WITHOUT OBSTRUCTION OR GANGRENE: Primary | ICD-10-CM

## 2023-07-06 LAB
ALBUMIN SERPL-MCNC: 4.2 G/DL (ref 3.5–5)
ALBUMIN/GLOB SERPL: 1.1 (ref 1.1–2.2)
ALP SERPL-CCNC: 177 U/L (ref 45–117)
ALT SERPL-CCNC: 24 U/L (ref 12–78)
ANION GAP SERPL CALC-SCNC: 9 MMOL/L (ref 5–15)
APPEARANCE UR: ABNORMAL
AST SERPL-CCNC: 21 U/L (ref 15–37)
BACTERIA URNS QL MICRO: NEGATIVE /HPF
BASOPHILS # BLD: 0.1 K/UL (ref 0–0.1)
BASOPHILS NFR BLD: 1 % (ref 0–1)
BILIRUB SERPL-MCNC: 0.7 MG/DL (ref 0.2–1)
BILIRUB UR QL: NEGATIVE
BUN SERPL-MCNC: 16 MG/DL (ref 6–20)
BUN/CREAT SERPL: 13 (ref 12–20)
CALCIUM SERPL-MCNC: 9.8 MG/DL (ref 8.5–10.1)
CHLORIDE SERPL-SCNC: 100 MMOL/L (ref 97–108)
CO2 SERPL-SCNC: 30 MMOL/L (ref 21–32)
COLOR UR: ABNORMAL
CREAT SERPL-MCNC: 1.28 MG/DL (ref 0.55–1.02)
DIFFERENTIAL METHOD BLD: NORMAL
EOSINOPHIL # BLD: 0.1 K/UL (ref 0–0.4)
EOSINOPHIL NFR BLD: 1 % (ref 0–7)
EPITH CASTS URNS QL MICRO: ABNORMAL /LPF
ERYTHROCYTE [DISTWIDTH] IN BLOOD BY AUTOMATED COUNT: 12.6 % (ref 11.5–14.5)
GLOBULIN SER CALC-MCNC: 3.8 G/DL (ref 2–4)
GLUCOSE SERPL-MCNC: 108 MG/DL (ref 65–100)
GLUCOSE UR STRIP.AUTO-MCNC: NEGATIVE MG/DL
HCT VFR BLD AUTO: 41.2 % (ref 35–47)
HGB BLD-MCNC: 13.6 G/DL (ref 11.5–16)
HGB UR QL STRIP: NEGATIVE
HYALINE CASTS URNS QL MICRO: ABNORMAL /LPF (ref 0–5)
IMM GRANULOCYTES # BLD AUTO: 0 K/UL (ref 0–0.04)
IMM GRANULOCYTES NFR BLD AUTO: 0 % (ref 0–0.5)
KETONES UR QL STRIP.AUTO: NEGATIVE MG/DL
LEUKOCYTE ESTERASE UR QL STRIP.AUTO: NEGATIVE
LIPASE SERPL-CCNC: 116 U/L (ref 73–393)
LYMPHOCYTES # BLD: 3.3 K/UL (ref 0.8–3.5)
LYMPHOCYTES NFR BLD: 31 % (ref 12–49)
MCH RBC QN AUTO: 31 PG (ref 26–34)
MCHC RBC AUTO-ENTMCNC: 33 G/DL (ref 30–36.5)
MCV RBC AUTO: 93.8 FL (ref 80–99)
MONOCYTES # BLD: 0.6 K/UL (ref 0–1)
MONOCYTES NFR BLD: 6 % (ref 5–13)
NEUTS SEG # BLD: 6.4 K/UL (ref 1.8–8)
NEUTS SEG NFR BLD: 61 % (ref 32–75)
NITRITE UR QL STRIP.AUTO: NEGATIVE
NRBC # BLD: 0 K/UL (ref 0–0.01)
NRBC BLD-RTO: 0 PER 100 WBC
PH UR STRIP: 6 (ref 5–8)
PLATELET # BLD AUTO: 294 K/UL (ref 150–400)
PMV BLD AUTO: 10.6 FL (ref 8.9–12.9)
POTASSIUM SERPL-SCNC: 3.3 MMOL/L (ref 3.5–5.1)
PROT SERPL-MCNC: 8 G/DL (ref 6.4–8.2)
PROT UR STRIP-MCNC: 30 MG/DL
RBC # BLD AUTO: 4.39 M/UL (ref 3.8–5.2)
RBC #/AREA URNS HPF: ABNORMAL /HPF (ref 0–5)
SODIUM SERPL-SCNC: 139 MMOL/L (ref 136–145)
SP GR UR REFRACTOMETRY: 1.02 (ref 1–1.03)
URINE CULTURE IF INDICATED: ABNORMAL
UROBILINOGEN UR QL STRIP.AUTO: 0.2 EU/DL (ref 0.2–1)
WBC # BLD AUTO: 10.5 K/UL (ref 3.6–11)
WBC URNS QL MICRO: ABNORMAL /HPF (ref 0–4)

## 2023-07-06 PROCEDURE — 74177 CT ABD & PELVIS W/CONTRAST: CPT

## 2023-07-06 PROCEDURE — 2580000003 HC RX 258: Performed by: STUDENT IN AN ORGANIZED HEALTH CARE EDUCATION/TRAINING PROGRAM

## 2023-07-06 PROCEDURE — 6360000002 HC RX W HCPCS: Performed by: STUDENT IN AN ORGANIZED HEALTH CARE EDUCATION/TRAINING PROGRAM

## 2023-07-06 PROCEDURE — 99285 EMERGENCY DEPT VISIT HI MDM: CPT

## 2023-07-06 PROCEDURE — 85025 COMPLETE CBC W/AUTO DIFF WBC: CPT

## 2023-07-06 PROCEDURE — 6360000004 HC RX CONTRAST MEDICATION: Performed by: STUDENT IN AN ORGANIZED HEALTH CARE EDUCATION/TRAINING PROGRAM

## 2023-07-06 PROCEDURE — 96374 THER/PROPH/DIAG INJ IV PUSH: CPT

## 2023-07-06 PROCEDURE — 36415 COLL VENOUS BLD VENIPUNCTURE: CPT

## 2023-07-06 PROCEDURE — 80053 COMPREHEN METABOLIC PANEL: CPT

## 2023-07-06 PROCEDURE — 83690 ASSAY OF LIPASE: CPT

## 2023-07-06 PROCEDURE — 81001 URINALYSIS AUTO W/SCOPE: CPT

## 2023-07-06 RX ORDER — AZITHROMYCIN 500 MG/1
TABLET, FILM COATED ORAL
COMMUNITY
Start: 2023-07-05

## 2023-07-06 RX ORDER — PRIMIDONE 50 MG/1
50 TABLET ORAL NIGHTLY
COMMUNITY
Start: 2023-05-26

## 2023-07-06 RX ORDER — VALSARTAN 80 MG/1
80 TABLET ORAL DAILY
COMMUNITY
Start: 2023-05-26

## 2023-07-06 RX ORDER — BENZONATATE 100 MG/1
100 CAPSULE ORAL
COMMUNITY
Start: 2023-07-05 | End: 2023-07-19

## 2023-07-06 RX ORDER — BUPROPION HYDROCHLORIDE 300 MG/1
TABLET ORAL
COMMUNITY
Start: 2023-06-09

## 2023-07-06 RX ORDER — ALPRAZOLAM 0.5 MG/1
0.5 TABLET ORAL DAILY PRN
COMMUNITY
Start: 2023-06-17

## 2023-07-06 RX ORDER — PROPRANOLOL HYDROCHLORIDE 20 MG/1
TABLET ORAL
COMMUNITY
Start: 2023-04-06

## 2023-07-06 RX ORDER — POTASSIUM CHLORIDE 750 MG/1
10 TABLET, FILM COATED, EXTENDED RELEASE ORAL DAILY
COMMUNITY
Start: 2023-05-01

## 2023-07-06 RX ORDER — 0.9 % SODIUM CHLORIDE 0.9 %
500 INTRAVENOUS SOLUTION INTRAVENOUS ONCE
Status: COMPLETED | OUTPATIENT
Start: 2023-07-06 | End: 2023-07-06

## 2023-07-06 RX ORDER — CEPHALEXIN 500 MG/1
CAPSULE ORAL
COMMUNITY
Start: 2023-04-04

## 2023-07-06 RX ORDER — ALBUTEROL SULFATE 2.5 MG/3ML
2.5 SOLUTION RESPIRATORY (INHALATION) EVERY 4 HOURS PRN
COMMUNITY
Start: 2017-10-16

## 2023-07-06 RX ORDER — HYDROCHLOROTHIAZIDE 25 MG/1
TABLET ORAL
COMMUNITY
Start: 2023-05-01

## 2023-07-06 RX ORDER — ATORVASTATIN CALCIUM 40 MG/1
40 TABLET, FILM COATED ORAL NIGHTLY
COMMUNITY
Start: 2023-05-26

## 2023-07-06 RX ORDER — ASPIRIN 81 MG/1
81 TABLET ORAL
COMMUNITY
Start: 2023-04-04 | End: 2024-03-29

## 2023-07-06 RX ORDER — CLOPIDOGREL BISULFATE 75 MG/1
75 TABLET ORAL DAILY
COMMUNITY

## 2023-07-06 RX ORDER — 0.9 % SODIUM CHLORIDE 0.9 %
500 INTRAVENOUS SOLUTION INTRAVENOUS
Status: COMPLETED | OUTPATIENT
Start: 2023-07-06 | End: 2023-07-06

## 2023-07-06 RX ORDER — ONDANSETRON 2 MG/ML
4 INJECTION INTRAMUSCULAR; INTRAVENOUS
Status: COMPLETED | OUTPATIENT
Start: 2023-07-06 | End: 2023-07-06

## 2023-07-06 RX ADMIN — SODIUM CHLORIDE 500 ML: 9 INJECTION, SOLUTION INTRAVENOUS at 17:44

## 2023-07-06 RX ADMIN — SODIUM CHLORIDE 500 ML: 9 INJECTION, SOLUTION INTRAVENOUS at 19:06

## 2023-07-06 RX ADMIN — IOPAMIDOL 100 ML: 755 INJECTION, SOLUTION INTRAVENOUS at 18:31

## 2023-07-06 RX ADMIN — ONDANSETRON 4 MG: 2 INJECTION INTRAMUSCULAR; INTRAVENOUS at 17:44

## 2023-07-06 ASSESSMENT — PAIN DESCRIPTION - LOCATION
LOCATION: ABDOMEN
LOCATION: ABDOMEN

## 2023-07-06 ASSESSMENT — PAIN DESCRIPTION - DESCRIPTORS: DESCRIPTORS: DISCOMFORT

## 2023-07-06 ASSESSMENT — PAIN SCALES - GENERAL
PAINLEVEL_OUTOF10: 4
PAINLEVEL_OUTOF10: 6

## 2023-07-06 ASSESSMENT — PAIN DESCRIPTION - ORIENTATION
ORIENTATION: MID;LEFT
ORIENTATION: MID
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
## 2023-07-06 NOTE — ED NOTES
Patient up restroom and returned to stretcher, specimen obtained. Patient with out complaint, warm blanket provided.       Laure Dominguez RN  07/06/23 1600

## 2023-07-06 NOTE — DISCHARGE INSTRUCTIONS
You presented to the ED with concern for bulging in her abdomen. As you are having pain and hernia did not appear incarcerated a CT scan was obtained. This showed a large ventral hernia but no signs of being stuck or any complications. It is appropriate for outpatient follow-up with general surgery.

## 2023-09-08 ENCOUNTER — HOSPITAL ENCOUNTER (OUTPATIENT)
Facility: HOSPITAL | Age: 73
End: 2023-09-08
Payer: MEDICARE

## 2023-09-08 VITALS
SYSTOLIC BLOOD PRESSURE: 140 MMHG | TEMPERATURE: 98.1 F | DIASTOLIC BLOOD PRESSURE: 65 MMHG | OXYGEN SATURATION: 98 % | HEART RATE: 63 BPM | WEIGHT: 145.9 LBS | BODY MASS INDEX: 27.55 KG/M2 | HEIGHT: 61 IN | RESPIRATION RATE: 14 BRPM

## 2023-09-08 LAB
ANION GAP SERPL CALC-SCNC: 5 MMOL/L (ref 5–15)
BUN SERPL-MCNC: 19 MG/DL (ref 6–20)
BUN/CREAT SERPL: 16 (ref 12–20)
CALCIUM SERPL-MCNC: 9.5 MG/DL (ref 8.5–10.1)
CHLORIDE SERPL-SCNC: 106 MMOL/L (ref 97–108)
CO2 SERPL-SCNC: 29 MMOL/L (ref 21–32)
CREAT SERPL-MCNC: 1.21 MG/DL (ref 0.55–1.02)
EKG ATRIAL RATE: 51 BPM
EKG DIAGNOSIS: NORMAL
EKG P AXIS: 23 DEGREES
EKG P-R INTERVAL: 142 MS
EKG Q-T INTERVAL: 410 MS
EKG QRS DURATION: 76 MS
EKG QTC CALCULATION (BAZETT): 377 MS
EKG R AXIS: 24 DEGREES
EKG T AXIS: 60 DEGREES
EKG VENTRICULAR RATE: 51 BPM
GLUCOSE SERPL-MCNC: 89 MG/DL (ref 65–100)
POTASSIUM SERPL-SCNC: 3.6 MMOL/L (ref 3.5–5.1)
SODIUM SERPL-SCNC: 140 MMOL/L (ref 136–145)

## 2023-09-08 PROCEDURE — 93005 ELECTROCARDIOGRAM TRACING: CPT | Performed by: SURGERY

## 2023-09-08 PROCEDURE — 93010 ELECTROCARDIOGRAM REPORT: CPT | Performed by: INTERNAL MEDICINE

## 2023-09-08 PROCEDURE — 80048 BASIC METABOLIC PNL TOTAL CA: CPT

## 2023-09-08 PROCEDURE — 36415 COLL VENOUS BLD VENIPUNCTURE: CPT

## 2023-09-08 ASSESSMENT — PAIN DESCRIPTION - LOCATION: LOCATION: LEG;KNEE

## 2023-09-08 ASSESSMENT — PAIN DESCRIPTION - ORIENTATION: ORIENTATION: RIGHT

## 2023-09-08 ASSESSMENT — PAIN DESCRIPTION - DESCRIPTORS: DESCRIPTORS: ACHING;TINGLING;NUMBNESS

## 2023-09-08 ASSESSMENT — PAIN DESCRIPTION - FREQUENCY: FREQUENCY: INTERMITTENT

## 2023-09-08 ASSESSMENT — PAIN DESCRIPTION - PAIN TYPE: TYPE: CHRONIC PAIN

## 2023-09-08 ASSESSMENT — PAIN - FUNCTIONAL ASSESSMENT: PAIN_FUNCTIONAL_ASSESSMENT: ACTIVITIES ARE NOT PREVENTED

## 2023-09-08 ASSESSMENT — PAIN SCALES - GENERAL: PAINLEVEL_OUTOF10: 2

## 2023-09-11 NOTE — PERIOP NOTE
Called Dr. Bud Fernandez' office to see if medication plan was completed. Office stated that did not receive medication plan request.  Refaxed medication plan to be completed to new number given.

## 2023-09-12 NOTE — PERIOP NOTE
Called Dr. Malika Le' office requesting aspirin medication plan. [facility] stated that she would send medication plan once completed.

## 2023-09-15 ENCOUNTER — ANESTHESIA (OUTPATIENT)
Facility: HOSPITAL | Age: 73
End: 2023-09-15
Payer: MEDICARE

## 2023-09-15 ENCOUNTER — ANESTHESIA EVENT (OUTPATIENT)
Facility: HOSPITAL | Age: 73
End: 2023-09-15
Payer: MEDICARE

## 2023-09-15 ENCOUNTER — HOSPITAL ENCOUNTER (OUTPATIENT)
Facility: HOSPITAL | Age: 73
Setting detail: OUTPATIENT SURGERY
Discharge: HOME | End: 2023-09-15
Attending: SURGERY | Admitting: SURGERY
Payer: MEDICARE

## 2023-09-15 VITALS
BODY MASS INDEX: 27.33 KG/M2 | OXYGEN SATURATION: 92 % | SYSTOLIC BLOOD PRESSURE: 113 MMHG | HEART RATE: 65 BPM | TEMPERATURE: 98.4 F | RESPIRATION RATE: 16 BRPM | DIASTOLIC BLOOD PRESSURE: 68 MMHG | WEIGHT: 144.62 LBS

## 2023-09-15 DIAGNOSIS — K43.2 INCISIONAL HERNIA, WITHOUT OBSTRUCTION OR GANGRENE: ICD-10-CM

## 2023-09-15 DIAGNOSIS — R47.01 APHASIA: Primary | ICD-10-CM

## 2023-09-15 PROCEDURE — 6360000002 HC RX W HCPCS: Performed by: SURGERY

## 2023-09-15 PROCEDURE — 7100000001 HC PACU RECOVERY - ADDTL 15 MIN: Performed by: SURGERY

## 2023-09-15 PROCEDURE — 2709999900 HC NON-CHARGEABLE SUPPLY: Performed by: SURGERY

## 2023-09-15 PROCEDURE — 6360000002 HC RX W HCPCS: Performed by: NURSE ANESTHETIST, CERTIFIED REGISTERED

## 2023-09-15 PROCEDURE — S2900 ROBOTIC SURGICAL SYSTEM: HCPCS | Performed by: SURGERY

## 2023-09-15 PROCEDURE — 7100000011 HC PHASE II RECOVERY - ADDTL 15 MIN: Performed by: SURGERY

## 2023-09-15 PROCEDURE — 3600000019 HC SURGERY ROBOT ADDTL 15MIN: Performed by: SURGERY

## 2023-09-15 PROCEDURE — C1781 MESH (IMPLANTABLE): HCPCS | Performed by: SURGERY

## 2023-09-15 PROCEDURE — 2500000003 HC RX 250 WO HCPCS: Performed by: NURSE ANESTHETIST, CERTIFIED REGISTERED

## 2023-09-15 PROCEDURE — 7100000000 HC PACU RECOVERY - FIRST 15 MIN: Performed by: SURGERY

## 2023-09-15 PROCEDURE — C9399 UNCLASSIFIED DRUGS OR BIOLOG: HCPCS | Performed by: NURSE ANESTHETIST, CERTIFIED REGISTERED

## 2023-09-15 PROCEDURE — 2580000003 HC RX 258: Performed by: SURGERY

## 2023-09-15 PROCEDURE — 6370000000 HC RX 637 (ALT 250 FOR IP): Performed by: ANESTHESIOLOGY

## 2023-09-15 PROCEDURE — 3600000009 HC SURGERY ROBOT BASE: Performed by: SURGERY

## 2023-09-15 PROCEDURE — 2580000003 HC RX 258: Performed by: ANESTHESIOLOGY

## 2023-09-15 PROCEDURE — 3700000000 HC ANESTHESIA ATTENDED CARE: Performed by: SURGERY

## 2023-09-15 PROCEDURE — 7100000010 HC PHASE II RECOVERY - FIRST 15 MIN: Performed by: SURGERY

## 2023-09-15 PROCEDURE — C9290 INJ, BUPIVACAINE LIPOSOME: HCPCS | Performed by: SURGERY

## 2023-09-15 PROCEDURE — 3700000001 HC ADD 15 MINUTES (ANESTHESIA): Performed by: SURGERY

## 2023-09-15 DEVICE — VENTRALIGHT ST MESH, 4.5" (11.4 CM), CIRCLE
Type: IMPLANTABLE DEVICE | Status: FUNCTIONAL
Brand: VENTRALIGHT

## 2023-09-15 RX ORDER — DEXMEDETOMIDINE HYDROCHLORIDE 100 UG/ML
INJECTION, SOLUTION INTRAVENOUS PRN
Status: DISCONTINUED | OUTPATIENT
Start: 2023-09-15 | End: 2023-09-15 | Stop reason: SDUPTHER

## 2023-09-15 RX ORDER — ACETAMINOPHEN 325 MG/1
650 TABLET ORAL ONCE
Status: COMPLETED | OUTPATIENT
Start: 2023-09-15 | End: 2023-09-15

## 2023-09-15 RX ORDER — PROPOFOL 10 MG/ML
INJECTION, EMULSION INTRAVENOUS PRN
Status: DISCONTINUED | OUTPATIENT
Start: 2023-09-15 | End: 2023-09-15 | Stop reason: SDUPTHER

## 2023-09-15 RX ORDER — DROPERIDOL 2.5 MG/ML
0.62 INJECTION, SOLUTION INTRAMUSCULAR; INTRAVENOUS
Status: DISCONTINUED | OUTPATIENT
Start: 2023-09-15 | End: 2023-09-15 | Stop reason: HOSPADM

## 2023-09-15 RX ORDER — HYDROCODONE BITARTRATE AND ACETAMINOPHEN 5; 325 MG/1; MG/1
1 TABLET ORAL EVERY 6 HOURS PRN
Qty: 12 TABLET | Refills: 0 | Status: SHIPPED | OUTPATIENT
Start: 2023-09-15 | End: 2023-09-18

## 2023-09-15 RX ORDER — SODIUM CHLORIDE, SODIUM LACTATE, POTASSIUM CHLORIDE, CALCIUM CHLORIDE 600; 310; 30; 20 MG/100ML; MG/100ML; MG/100ML; MG/100ML
INJECTION, SOLUTION INTRAVENOUS CONTINUOUS
Status: DISCONTINUED | OUTPATIENT
Start: 2023-09-15 | End: 2023-09-15 | Stop reason: HOSPADM

## 2023-09-15 RX ORDER — FENTANYL CITRATE 50 UG/ML
INJECTION, SOLUTION INTRAMUSCULAR; INTRAVENOUS PRN
Status: DISCONTINUED | OUTPATIENT
Start: 2023-09-15 | End: 2023-09-15 | Stop reason: SDUPTHER

## 2023-09-15 RX ORDER — LABETALOL HYDROCHLORIDE 5 MG/ML
10 INJECTION, SOLUTION INTRAVENOUS
Status: DISCONTINUED | OUTPATIENT
Start: 2023-09-15 | End: 2023-09-15 | Stop reason: HOSPADM

## 2023-09-15 RX ORDER — MIDAZOLAM HYDROCHLORIDE 1 MG/ML
INJECTION INTRAMUSCULAR; INTRAVENOUS PRN
Status: DISCONTINUED | OUTPATIENT
Start: 2023-09-15 | End: 2023-09-15 | Stop reason: SDUPTHER

## 2023-09-15 RX ORDER — EPHEDRINE SULFATE/0.9% NACL/PF 50 MG/5 ML
SYRINGE (ML) INTRAVENOUS PRN
Status: DISCONTINUED | OUTPATIENT
Start: 2023-09-15 | End: 2023-09-15 | Stop reason: SDUPTHER

## 2023-09-15 RX ORDER — SUCCINYLCHOLINE/SOD CL,ISO/PF 100 MG/5ML
SYRINGE (ML) INTRAVENOUS PRN
Status: DISCONTINUED | OUTPATIENT
Start: 2023-09-15 | End: 2023-09-15 | Stop reason: SDUPTHER

## 2023-09-15 RX ORDER — DEXAMETHASONE SODIUM PHOSPHATE 4 MG/ML
INJECTION, SOLUTION INTRA-ARTICULAR; INTRALESIONAL; INTRAMUSCULAR; INTRAVENOUS; SOFT TISSUE PRN
Status: DISCONTINUED | OUTPATIENT
Start: 2023-09-15 | End: 2023-09-15 | Stop reason: SDUPTHER

## 2023-09-15 RX ORDER — LIDOCAINE HYDROCHLORIDE 10 MG/ML
1 INJECTION, SOLUTION EPIDURAL; INFILTRATION; INTRACAUDAL; PERINEURAL
Status: DISCONTINUED | OUTPATIENT
Start: 2023-09-15 | End: 2023-09-15 | Stop reason: HOSPADM

## 2023-09-15 RX ORDER — ONDANSETRON 2 MG/ML
4 INJECTION INTRAMUSCULAR; INTRAVENOUS
Status: DISCONTINUED | OUTPATIENT
Start: 2023-09-15 | End: 2023-09-15 | Stop reason: HOSPADM

## 2023-09-15 RX ORDER — ROCURONIUM BROMIDE 10 MG/ML
INJECTION, SOLUTION INTRAVENOUS PRN
Status: DISCONTINUED | OUTPATIENT
Start: 2023-09-15 | End: 2023-09-15 | Stop reason: SDUPTHER

## 2023-09-15 RX ORDER — DIPHENHYDRAMINE HYDROCHLORIDE 50 MG/ML
12.5 INJECTION INTRAMUSCULAR; INTRAVENOUS
Status: DISCONTINUED | OUTPATIENT
Start: 2023-09-15 | End: 2023-09-15 | Stop reason: HOSPADM

## 2023-09-15 RX ORDER — FAMOTIDINE 10 MG/ML
INJECTION, SOLUTION INTRAVENOUS PRN
Status: DISCONTINUED | OUTPATIENT
Start: 2023-09-15 | End: 2023-09-15 | Stop reason: SDUPTHER

## 2023-09-15 RX ORDER — ONDANSETRON 2 MG/ML
INJECTION INTRAMUSCULAR; INTRAVENOUS PRN
Status: DISCONTINUED | OUTPATIENT
Start: 2023-09-15 | End: 2023-09-15 | Stop reason: SDUPTHER

## 2023-09-15 RX ORDER — PHENYLEPHRINE HCL IN 0.9% NACL 0.4MG/10ML
SYRINGE (ML) INTRAVENOUS PRN
Status: DISCONTINUED | OUTPATIENT
Start: 2023-09-15 | End: 2023-09-15 | Stop reason: SDUPTHER

## 2023-09-15 RX ORDER — MEPERIDINE HYDROCHLORIDE 25 MG/ML
12.5 INJECTION INTRAMUSCULAR; INTRAVENOUS; SUBCUTANEOUS EVERY 5 MIN PRN
Status: DISCONTINUED | OUTPATIENT
Start: 2023-09-15 | End: 2023-09-15 | Stop reason: HOSPADM

## 2023-09-15 RX ADMIN — Medication 5 MG: at 08:02

## 2023-09-15 RX ADMIN — ROCURONIUM BROMIDE 10 MG: 10 INJECTION, SOLUTION INTRAVENOUS at 08:40

## 2023-09-15 RX ADMIN — FAMOTIDINE 20 MG: 10 INJECTION, SOLUTION INTRAVENOUS at 07:32

## 2023-09-15 RX ADMIN — CEFAZOLIN SODIUM 2000 MG: 1 POWDER, FOR SOLUTION INTRAMUSCULAR; INTRAVENOUS at 07:47

## 2023-09-15 RX ADMIN — Medication 80 MCG: at 07:56

## 2023-09-15 RX ADMIN — ROCURONIUM BROMIDE 5 MG: 10 INJECTION, SOLUTION INTRAVENOUS at 07:40

## 2023-09-15 RX ADMIN — Medication 80 MCG: at 07:51

## 2023-09-15 RX ADMIN — FENTANYL CITRATE 75 MCG: 50 INJECTION, SOLUTION INTRAMUSCULAR; INTRAVENOUS at 07:40

## 2023-09-15 RX ADMIN — Medication 100 MG: at 07:42

## 2023-09-15 RX ADMIN — ONDANSETRON 4 MG: 2 INJECTION INTRAMUSCULAR; INTRAVENOUS at 07:32

## 2023-09-15 RX ADMIN — SUGAMMADEX 200 MG: 100 INJECTION, SOLUTION INTRAVENOUS at 09:05

## 2023-09-15 RX ADMIN — Medication 5 MG: at 07:56

## 2023-09-15 RX ADMIN — DEXMEDETOMIDINE 4 MCG: 100 INJECTION, SOLUTION INTRAVENOUS at 08:20

## 2023-09-15 RX ADMIN — Medication 5 MG: at 08:34

## 2023-09-15 RX ADMIN — DEXAMETHASONE SODIUM PHOSPHATE 4 MG: 4 INJECTION INTRA-ARTICULAR; INTRALESIONAL; INTRAMUSCULAR; INTRAVENOUS; SOFT TISSUE at 07:45

## 2023-09-15 RX ADMIN — Medication 80 MCG: at 08:02

## 2023-09-15 RX ADMIN — Medication 10 MG: at 08:11

## 2023-09-15 RX ADMIN — Medication 5 MG: at 08:41

## 2023-09-15 RX ADMIN — PROPOFOL 20 MG: 10 INJECTION, EMULSION INTRAVENOUS at 07:42

## 2023-09-15 RX ADMIN — Medication 10 MG: at 07:55

## 2023-09-15 RX ADMIN — MIDAZOLAM HYDROCHLORIDE 2 MG: 1 INJECTION, SOLUTION INTRAMUSCULAR; INTRAVENOUS at 07:32

## 2023-09-15 RX ADMIN — SODIUM CHLORIDE, POTASSIUM CHLORIDE, SODIUM LACTATE AND CALCIUM CHLORIDE: 600; 310; 30; 20 INJECTION, SOLUTION INTRAVENOUS at 07:45

## 2023-09-15 RX ADMIN — Medication 120 MCG: at 07:53

## 2023-09-15 RX ADMIN — Medication 5 MG: at 08:04

## 2023-09-15 RX ADMIN — ACETAMINOPHEN 650 MG: 325 TABLET ORAL at 07:17

## 2023-09-15 RX ADMIN — ROCURONIUM BROMIDE 25 MG: 10 INJECTION, SOLUTION INTRAVENOUS at 07:50

## 2023-09-15 RX ADMIN — SODIUM CHLORIDE, POTASSIUM CHLORIDE, SODIUM LACTATE AND CALCIUM CHLORIDE: 600; 310; 30; 20 INJECTION, SOLUTION INTRAVENOUS at 06:38

## 2023-09-15 RX ADMIN — PROPOFOL 150 MG: 10 INJECTION, EMULSION INTRAVENOUS at 07:40

## 2023-09-15 RX ADMIN — ROCURONIUM BROMIDE 10 MG: 10 INJECTION, SOLUTION INTRAVENOUS at 08:18

## 2023-09-15 RX ADMIN — Medication 40 MCG: at 07:48

## 2023-09-15 ASSESSMENT — LIFESTYLE VARIABLES: SMOKING_STATUS: 1

## 2023-09-15 ASSESSMENT — COPD QUESTIONNAIRES: CAT_SEVERITY: MILD

## 2023-09-15 ASSESSMENT — PAIN DESCRIPTION - PAIN TYPE: TYPE: ACUTE PAIN;SURGICAL PAIN

## 2023-09-15 ASSESSMENT — PAIN SCALES - GENERAL
PAINLEVEL_OUTOF10: 2
PAINLEVEL_OUTOF10: 2
PAINLEVEL_OUTOF10: 0
PAINLEVEL_OUTOF10: 0

## 2023-09-15 ASSESSMENT — PAIN - FUNCTIONAL ASSESSMENT
PAIN_FUNCTIONAL_ASSESSMENT: ACTIVITIES ARE NOT PREVENTED
PAIN_FUNCTIONAL_ASSESSMENT: 0-10

## 2023-09-15 ASSESSMENT — PAIN DESCRIPTION - DESCRIPTORS: DESCRIPTORS: ACHING

## 2023-09-15 ASSESSMENT — PAIN DESCRIPTION - LOCATION: LOCATION: ABDOMEN

## 2023-09-15 NOTE — BRIEF OP NOTE
Brief Postoperative Note      Patient: Norine Oppenheim  YOB: 1950  MRN: [id number]    Date of Procedure: 9/15/2023    Pre-Op Diagnosis Codes:     * Incisional hernia with obstruction but no gangrene [K43.0]    Post-Op Diagnosis: Same       Procedure(s):  ROBOTIC INCISIONAL HERNIA REPAIR WITH MESH    Surgeon(s):  Xenia Warren MD    Assistant:  Surgical Assistant: Delight Hives    Anesthesia: General    Estimated Blood Loss (mL): Minimal    Complications: None    Specimens:   * No specimens in log *    Implants:  Implant Name Type Inv. Item Serial No.  Lot No. LRB No. Used Action   MESH SURG DIA4. Jo Clas CIR SEPRA Rosetta Castro - [id number]  MESH SURG DIA4. 5IN CIR [employer] Peggy CHAN- [id number]/A 1 Implanted         Drains: * No LDAs found *    Findings: 4 cm defect    Electronically signed by Laura Irizarry MD on 9/15/2023 at 8:59 AM

## 2023-09-15 NOTE — OP NOTE
Operative Note      Patient: Carolyn Lima  YOB: 1950  MRN: [id number]    Date of Procedure: 9/15/2023    Pre-Op Diagnosis Codes:     * Incisional hernia with obstruction but no gangrene [K43.0]    Post-Op Diagnosis: Same       Procedure(s):  ROBOTIC INCISIONAL HERNIA REPAIR WITH MESH    Surgeon(s):  Soraida Yun MD    Assistant:  Surgical Assistant: Sandro Gallegos    Anesthesia: General    Estimated Blood Loss (mL): Minimal    Complications: None    Specimens:   * No specimens in log *    Implants:  Implant Name Type Inv. Item Serial No.  Lot No. LRB No. Used Action   MESH SURG DIA4. Abi Ryan CIR SEPRA Merline Pony - [id number]  MESH SURG DIA4. 5IN CIR [employer] Paul CHAN- [id number]/A [id number] Implanted         Drains: * No LDAs found *    Findings: 4 cm defect    Electronically signed by Eder Valencia MD on 9/15/2023 at 8:59 AM    INDICATION:  See H/P. PROCEDURE:  After consent was obtained, the patient was taken to the operating room, placed in supine position. SCDs were placed, turned on and working. General anesthesia was instituted successfully. Victor was not placed. The patient's left side and flank were bumped with padding. All bony prominences were protected. The patient's abdomen was  prepped and draped in the usual sterile fashion. A preoperative time-out was completed confirming any special needs. Preincision antibiotics were started 30 minutes prior to skin incision. The abdomen was entered through a 5mm left upper quadrant Woodall's point skin incision. The laparoscope was used to access the patient's abdomen under direct camera vision with a blunt 5 mm tissue dissection port. Camera was reintroduced into the port, and there was no visible or palpable injury to the underlying tissues or bowel or organs. Pneumoperitoneum was started and maintained at 15 mmHg.  Two 8mm working ports were placed along the patient's left side under

## 2023-09-15 NOTE — ANESTHESIA POSTPROCEDURE EVALUATION
Department of Anesthesiology  Postprocedure Note    Patient: Norine Oppenheim  MRN: [id number]  YOB: 1950  Date of evaluation: 9/15/2023      Procedure Summary     Date: 09/15/23 Room / Location: [facility] MAIN OR F6 / [facility] MAIN OR    Anesthesia Start: 0732 Anesthesia Stop: 9083    Procedure: ROBOTIC INCISIONAL HERNIA REPAIR WITH MESH (Abdomen) Diagnosis:       Incisional hernia with obstruction but no gangrene      (Incisional hernia with obstruction but no gangrene [K43.0])    Surgeons: Xenia Warren MD Responsible Provider: Krista Nevarez MD    Anesthesia Type: General ASA Status: 3          Anesthesia Type: General    Diane Phase I: Diane Score: 9    Diane Phase II:        Anesthesia Post Evaluation    Patient location during evaluation: PACU  Patient participation: complete - patient participated  Level of consciousness: awake  Airway patency: patent  Nausea & Vomiting: no vomiting and no nausea  Complications: no  Cardiovascular status: hemodynamically stable  Respiratory status: acceptable  Hydration status: stable  Pain management: adequate

## 2023-09-15 NOTE — H&P
Assessment:     Symptomatic incisional hernia    Plan:     Incisional Hernia repair with Mesh    Signed By: Ashanti Julian MD  [email or web address]  Office:  [phone]  Fax:  [phone]    September 15, 2023          General Surgery History and Physical    Subjective:      Magaly Washington is a 72 y.o.  female who presents with symptomatic incisional hernia. See paper h/p for full details. PCP:  Lorraine Garcia    Past Medical History:   Diagnosis Date    AAA (abdominal aortic aneurysm) ([address])     repaired 2021    Adverse effect of anesthesia     required albuterol treatments post cholecystectomy    Anxiety     Arthritis     Bigeminy     COPD (chronic obstructive pulmonary disease) ([address])     Depression     Essential tremor     saw Neurology once when dx \"years ago\", controlled with meds as of 2023    Falls     per pt as of 9/8/2023 last fall was 8 weeks ago when tripped on stair, prior that  3 months ago when tripped over dog    Fatigue     Hypercholesterolemia     Hypertension     Multiple thyroid nodules     hasn't had testing in past few yrs as of 2023 per pt    Renal artery stenosis ([address])     stent placed 2021 during AAA repair    Vertigo     Visual disturbance     floaters per pt     Past Surgical History:   Procedure Laterality Date    ABDOMINAL AORTIC ANEURYSM REPAIR  2021    placed renal artery stents at same time    ANKLE SURGERY Right     age 14    APPENDECTOMY      age 17    CHOLECYSTECTOMY      2022 or 2021    COLONOSCOPY      HYSTERECTOMY (CERVIX STATUS UNKNOWN)      age 34    SHOULDER ARTHROSCOPY W/ ROTATOR CUFF REPAIR Right     approx 2002    TONSILLECTOMY AND ADENOIDECTOMY        History reviewed. No pertinent family history.   Social History     Socioeconomic History    Marital status:      Spouse name: None    Number of children: None    Years of education: None    Highest education level: None   Tobacco Use    Smoking status: Former     Packs/day: 1.00     Years: 50.00

## 2023-09-15 NOTE — DISCHARGE INSTRUCTIONS
it is okay. Your doctor will tell you when you can have sex again. Diet    You can eat your normal diet. If your stomach is upset, try bland, low-fat foods like plain rice, broiled chicken, toast, and yogurt. Drink plenty of fluids (unless your doctor tells you not to). You may notice that your bowel movements are not regular right after your surgery. This is common. Avoid constipation and straining with bowel movements. You may want to take a fiber supplement every day. If you have not had a bowel movement after a couple of days, ask your doctor about taking a mild laxative. Medicines    Your doctor will tell you if and when you can restart your medicines. He or she will also give you instructions about taking any new medicines. If you stopped taking aspirin or some other blood thinner, your doctor will tell you when to start taking it again. Be safe with medicines. Take pain medicines exactly as directed. If the doctor gave you a prescription medicine for pain, take it as prescribed. If you are not taking a prescription pain medicine, take an over-the-counter medicine such as acetaminophen (Tylenol), ibuprofen (Advil, Motrin), or naproxen (Aleve). Read and follow all instructions on the label. Do not take two or more pain medicines at the same time unless the doctor told you to. Many pain medicines have acetaminophen, which is Tylenol. Too much acetaminophen (Tylenol) can be harmful. If your doctor prescribed antibiotics, take them as directed. Do not stop taking them just because you feel better. You need to take the full course of antibiotics. If you think your pain medicine is making you sick to your stomach: Take your medicine after meals (unless your doctor has told you not to). Ask your doctor for a different pain medicine. Incision care    If you have strips of tape on the cut (incision) the doctor made, leave the tape on for a week or until it falls off.      If you

## 2023-09-15 NOTE — DISCHARGE SUMMARY
Discharge Summary    Patient: Dread Aleman               Sex: female          DOA: 9/15/2023  5:37 AM       YOB: 1950      Age:  72 y.o.        LOS:  LOS: 0 days                Discharge Date:      Admission Diagnoses: Incisional hernia with obstruction but no gangrene [K43.0]    Discharge Diagnoses:  Same    Procedure:  Procedure(s):  ROBOTIC INCISIONAL HERNIA REPAIR WITH MESH    Discharge Condition: Good    Hospital Course: Unremarkable operative procedure. Discharge to home in stable condition. Consults: None    Significant Diagnostic Studies: See full electronic record. Discharge Medications:     Current Discharge Medication List        START taking these medications    Details   HYDROcodone-acetaminophen (NORCO) 5-325 MG per tablet Take 1 tablet by mouth every 6 hours as needed for Pain for up to 3 days. Intended supply: 3 days. Take lowest dose possible to manage pain Max Daily Amount: 4 tablets  Qty: 12 tablet, Refills: 0    Comments: Reduce doses taken as pain becomes manageable  Associated Diagnoses: Incisional hernia, without obstruction or gangrene           CONTINUE these medications which have NOT CHANGED    Details   albuterol (PROVENTIL) (2.5 MG/3ML) 0.083% nebulizer solution Inhale 3 mLs into the lungs every 4 hours as needed      ALPRAZolam (XANAX) 0.5 MG tablet Take 1 tablet by mouth daily as needed. aspirin 81 MG EC tablet Take 1 tablet by mouth daily      atorvastatin (LIPITOR) 40 MG tablet Take 1 tablet by mouth nightly      buPROPion (WELLBUTRIN XL) 300 MG extended release tablet TAKE 1 TABLET BY MOUTH EVERY MORNING. TAKE MORNING OF SURGERY      hydroCHLOROthiazide (HYDRODIURIL) 25 MG tablet TAKE 1 TABLET BY MOUTH EVERY MORNING. DO NOT TAKE THE MORNING OF SURGERY      potassium chloride (KLOR-CON) 10 MEQ extended release tablet Take 1 tablet by mouth daily      primidone (MYSOLINE) 50 MG tablet Take 1 tablet by mouth nightly at bedtime.       propranolol

## 2023-09-15 NOTE — ANESTHESIA PRE PROCEDURE
Tests: No results for input(s): \"POCGLU\", \"POCNA\", \"POCK\", \"POCCL\", \"POCBUN\", \"POCHEMO\", \"POCHCT\" in the last 72 hours. Coags: No results found for: \"PROTIME\", \"INR\", \"APTT\"    HCG (If Applicable): No results found for: \"PREGTESTUR\", \"PREGSERUM\", \"HCG\", \"HCGQUANT\"     ABGs: No results found for: \"PHART\", \"PO2ART\", \"ZPV6TAP\", \"OXF5HKD\", \"BEART\", \"F1OTBHKJ\"     Type & Screen (If Applicable):  No results found for: \"LABABO\", \"LABRH\"    Drug/Infectious Status (If Applicable):  No results found for: \"HIV\", \"HEPCAB\"    COVID-19 Screening (If Applicable): No results found for: \"COVID19\"        Anesthesia Evaluation  Patient summary reviewed and Nursing notes reviewed  Airway: Mallampati: III  TM distance: >3 FB   Neck ROM: full  Mouth opening: > = 3 FB   Dental:    (+) upper dentures and lower dentures      Pulmonary:   (+) COPD: mild,  current smoker                           Cardiovascular:  Exercise tolerance: good (>4 METS),   (+) hypertension:, hyperlipidemia        Rhythm: regular  Rate: normal                    Neuro/Psych:   (+) depression/anxiety             GI/Hepatic/Renal: Neg GI/Hepatic/Renal ROS  (+) renal disease: CRI,           Endo/Other:    (+) : arthritis:., .                 Abdominal:             Vascular: negative vascular ROS.  + PVD, aortic or cerebral, . Other Findings:           Anesthesia Plan      general     ASA 3       Induction: intravenous. Anesthetic plan and risks discussed with patient.                         Kimberly Aguilar MD   9/15/2023

## 2024-02-08 ENCOUNTER — OFFICE VISIT (OUTPATIENT)
Age: 74
End: 2024-02-08
Payer: MEDICARE

## 2024-02-08 ENCOUNTER — NURSE ONLY (OUTPATIENT)
Age: 74
End: 2024-02-08
Payer: MEDICARE

## 2024-02-08 VITALS
DIASTOLIC BLOOD PRESSURE: 62 MMHG | HEIGHT: 61 IN | WEIGHT: 136.8 LBS | RESPIRATION RATE: 16 BRPM | OXYGEN SATURATION: 98 % | HEART RATE: 65 BPM | BODY MASS INDEX: 25.83 KG/M2 | TEMPERATURE: 97.6 F | SYSTOLIC BLOOD PRESSURE: 114 MMHG

## 2024-02-08 DIAGNOSIS — Z11.59 ENCOUNTER FOR HCV SCREENING TEST FOR LOW RISK PATIENT: ICD-10-CM

## 2024-02-08 DIAGNOSIS — E78.5 HYPERLIPIDEMIA, UNSPECIFIED HYPERLIPIDEMIA TYPE: ICD-10-CM

## 2024-02-08 DIAGNOSIS — G25.0 ESSENTIAL TREMOR: ICD-10-CM

## 2024-02-08 DIAGNOSIS — R73.02 IMPAIRED GLUCOSE TOLERANCE: ICD-10-CM

## 2024-02-08 DIAGNOSIS — Z00.00 MEDICARE ANNUAL WELLNESS VISIT, SUBSEQUENT: ICD-10-CM

## 2024-02-08 DIAGNOSIS — R63.4 UNINTENTIONAL WEIGHT LOSS: ICD-10-CM

## 2024-02-08 DIAGNOSIS — Z87.891 PERSONAL HISTORY OF TOBACCO USE: ICD-10-CM

## 2024-02-08 DIAGNOSIS — R63.4 UNINTENTIONAL WEIGHT LOSS: Primary | ICD-10-CM

## 2024-02-08 PROCEDURE — 99203 OFFICE O/P NEW LOW 30 MIN: CPT | Performed by: FAMILY MEDICINE

## 2024-02-08 PROCEDURE — G0439 PPPS, SUBSEQ VISIT: HCPCS | Performed by: FAMILY MEDICINE

## 2024-02-08 PROCEDURE — G0296 VISIT TO DETERM LDCT ELIG: HCPCS | Performed by: FAMILY MEDICINE

## 2024-02-08 PROCEDURE — 1124F ACP DISCUSS-NO DSCNMKR DOCD: CPT | Performed by: FAMILY MEDICINE

## 2024-02-08 SDOH — ECONOMIC STABILITY: FOOD INSECURITY: WITHIN THE PAST 12 MONTHS, YOU WORRIED THAT YOUR FOOD WOULD RUN OUT BEFORE YOU GOT MONEY TO BUY MORE.: NEVER TRUE

## 2024-02-08 SDOH — ECONOMIC STABILITY: INCOME INSECURITY: HOW HARD IS IT FOR YOU TO PAY FOR THE VERY BASICS LIKE FOOD, HOUSING, MEDICAL CARE, AND HEATING?: NOT HARD AT ALL

## 2024-02-08 SDOH — ECONOMIC STABILITY: FOOD INSECURITY: WITHIN THE PAST 12 MONTHS, THE FOOD YOU BOUGHT JUST DIDN'T LAST AND YOU DIDN'T HAVE MONEY TO GET MORE.: NEVER TRUE

## 2024-02-08 SDOH — ECONOMIC STABILITY: HOUSING INSECURITY
IN THE LAST 12 MONTHS, WAS THERE A TIME WHEN YOU DID NOT HAVE A STEADY PLACE TO SLEEP OR SLEPT IN A SHELTER (INCLUDING NOW)?: NO

## 2024-02-08 ASSESSMENT — PATIENT HEALTH QUESTIONNAIRE - PHQ9
9. THOUGHTS THAT YOU WOULD BE BETTER OFF DEAD, OR OF HURTING YOURSELF: 0
SUM OF ALL RESPONSES TO PHQ QUESTIONS 1-9: 0
2. FEELING DOWN, DEPRESSED OR HOPELESS: 0
SUM OF ALL RESPONSES TO PHQ9 QUESTIONS 1 & 2: 0
3. TROUBLE FALLING OR STAYING ASLEEP: 0
10. IF YOU CHECKED OFF ANY PROBLEMS, HOW DIFFICULT HAVE THESE PROBLEMS MADE IT FOR YOU TO DO YOUR WORK, TAKE CARE OF THINGS AT HOME, OR GET ALONG WITH OTHER PEOPLE: 0
SUM OF ALL RESPONSES TO PHQ QUESTIONS 1-9: 0
8. MOVING OR SPEAKING SO SLOWLY THAT OTHER PEOPLE COULD HAVE NOTICED. OR THE OPPOSITE, BEING SO FIGETY OR RESTLESS THAT YOU HAVE BEEN MOVING AROUND A LOT MORE THAN USUAL: 0
5. POOR APPETITE OR OVEREATING: 0
1. LITTLE INTEREST OR PLEASURE IN DOING THINGS: 0
7. TROUBLE CONCENTRATING ON THINGS, SUCH AS READING THE NEWSPAPER OR WATCHING TELEVISION: 0
SUM OF ALL RESPONSES TO PHQ QUESTIONS 1-9: 0
6. FEELING BAD ABOUT YOURSELF - OR THAT YOU ARE A FAILURE OR HAVE LET YOURSELF OR YOUR FAMILY DOWN: 0
4. FEELING TIRED OR HAVING LITTLE ENERGY: 0
SUM OF ALL RESPONSES TO PHQ QUESTIONS 1-9: 0

## 2024-02-08 ASSESSMENT — LIFESTYLE VARIABLES
HOW OFTEN DO YOU HAVE A DRINK CONTAINING ALCOHOL: NEVER
HOW MANY STANDARD DRINKS CONTAINING ALCOHOL DO YOU HAVE ON A TYPICAL DAY: PATIENT DOES NOT DRINK

## 2024-02-08 NOTE — PROGRESS NOTES
Identified pt with two pt identifiers(name and ).    Chief Complaint   Patient presents with    Medicare AWV     Pt here today for her annual wellness visit.     Weight Loss     Ps has been losing weight with out trying. She states she has no appetite.         Health Maintenance Due   Topic    Lipids     Depression Screen     Hepatitis C screen     Colorectal Cancer Screen     Breast cancer screen     Shingles vaccine (1 of 2)    Low dose CT lung screening &/or counseling     DEXA (modify frequency per FRAX score)     Respiratory Syncytial Virus (RSV) Pregnant or age 60 yrs+ (1 - 1-dose 60+ series)    COVID-19 Vaccine (3 - 2023-24 season)    Annual Wellness Visit (Medicare)        Wt Readings from Last 3 Encounters:   24 62.1 kg (136 lb 12.8 oz)   09/15/23 65.6 kg (144 lb 10 oz)   23 66.2 kg (145 lb 14.4 oz)     Temp Readings from Last 3 Encounters:   24 97.6 °F (36.4 °C) (Temporal)   09/15/23 98.4 °F (36.9 °C) (Oral)   23 98.1 °F (36.7 °C) (Temporal)     BP Readings from Last 3 Encounters:   24 114/62   09/15/23 113/68   23 (!) 140/65     Pulse Readings from Last 3 Encounters:   24 65   09/15/23 65   23 63           Depression Screening:  :         2024    10:09 AM   PHQ-9 Questionaire   Little interest or pleasure in doing things 0   Feeling down, depressed, or hopeless 0   Trouble falling or staying asleep, or sleeping too much 0   Feeling tired or having little energy 0   Poor appetite or overeating 0   Feeling bad about yourself - or that you are a failure or have let yourself or your family down 0   Trouble concentrating on things, such as reading the newspaper or watching television 0   Moving or speaking so slowly that other people could have noticed. Or the opposite - being so fidgety or restless that you have been moving around a lot more than usual 0   Thoughts that you would be better off dead, or of hurting yourself in some way 0   PHQ-9 Total Score

## 2024-02-08 NOTE — PATIENT INSTRUCTIONS
Try to improve only a little bit at a time. Pick just one area to improve on at first. And if an activity hurts, stop and talk to your doctor.  Where can you learn more?  Go to https://www.Our[employer].net/patientEd and enter P600 to learn more about \"Learning About Being Active as an Older Adult.\"  Current as of: June 6, 2023               Content Version: 13.9  © 0627-2285 [employer].   Care instructions adapted under license by [employer]. If you have questions about a medical condition or this instruction, always ask your healthcare professional. [employer] disclaims any warranty or liability for your use of this information.           Learning About Vision Tests  What are vision tests?     The four most common vision tests are visual acuity tests, refraction, visual field tests, and color vision tests.  Visual acuity (sharpness) tests  These tests are used:  To see if you need glasses or contact lenses.  To monitor an eye problem.  To check an eye injury.  Visual acuity tests are done as part of routine exams. You may also have this test when you get your 's license or apply for some types of jobs.  Visual field tests  These tests are used:  To check for vision loss in any area of your range of vision.  To screen for certain eye diseases.  To look for nerve damage after a stroke, head injury, or other problem that could reduce blood flow to the brain.  Refraction and color tests  A refraction test is done to find the right prescription for glasses and contact lenses.  A color vision test is done to check for color blindness.  Color vision is often tested as part of a routine exam. You may also have this test when you apply for a job where recognizing different colors is important, such as , electronics, or the .  How are vision tests done?  Visual acuity test   You cover one eye at a time.  You read aloud from a wall chart across the room.  You read aloud

## 2024-02-08 NOTE — PROGRESS NOTES
[facility]  [address] [place] [address]  Phone: [phone]  Fax: [phone]        Chief Complaint   Patient presents with    Medicare AWV     Pt here today for her annual wellness visit.     Weight Loss     Ps has been losing weight with out trying. She states she has no appetite.      She is a 73 y.o. female who presents for [facility] care.  She is on chronic xanax in addition to wellbutrin for anxiety.  Has taken this for \"quite a while\".  Does not take these every day, but  reflects monthly fills.    She is concerned that she is losing weight without trying.  Says she has no appetite.  Says that she weighed 165lbs August.    She has a 50 pack year smoking history.  Quit 2.5 years ago.  She did  smoking over the weekend at her 's .  Says she smokes marijuana daily as this is the only way that she can eat.    Presents for HTN follow up.  Taking medications as prescribed and reports no side effects.  Denies chest pain, headache, visual disturbances.  Does not check BP at home.    Prior to Visit Medications    Medication Sig Taking? Authorizing Provider   albuterol (PROVENTIL) (2.5 MG/3ML) 0.083% nebulizer solution Inhale 3 mLs into the lungs every 4 hours as needed  Renae Hernández MD   ALPRAZolam (XANAX) 0.5 MG tablet Take 1 tablet by mouth daily as needed.  Renae Hernández MD   aspirin 81 MG EC tablet Take 1 tablet by mouth daily  Renae Hernández MD   atorvastatin (LIPITOR) 40 MG tablet Take 1 tablet by mouth nightly  Renae Hernández MD   buPROPion (WELLBUTRIN XL) 300 MG extended release tablet TAKE 1 TABLET BY MOUTH EVERY MORNING. TAKE MORNING OF SURGERY  Renae Hernández MD   hydroCHLOROthiazide (HYDRODIURIL) 25 MG tablet TAKE 1 TABLET BY MOUTH EVERY MORNING. DO NOT TAKE THE MORNING OF SURGERY  Renae Hernández MD   potassium chloride (KLOR-CON) 10 MEQ extended release tablet Take 1 tablet by mouth daily

## 2024-02-09 LAB
ALBUMIN SERPL-MCNC: 4.1 G/DL (ref 3.5–5)
ALBUMIN/GLOB SERPL: 1.6 (ref 1.1–2.2)
ALP SERPL-CCNC: 122 U/L (ref 45–117)
ALT SERPL-CCNC: 23 U/L (ref 12–78)
ANION GAP SERPL CALC-SCNC: 3 MMOL/L (ref 5–15)
AST SERPL-CCNC: 19 U/L (ref 15–37)
BASOPHILS # BLD: 0.1 K/UL (ref 0–0.1)
BASOPHILS NFR BLD: 1 % (ref 0–1)
BILIRUB SERPL-MCNC: 0.5 MG/DL (ref 0.2–1)
BUN SERPL-MCNC: 21 MG/DL (ref 6–20)
BUN/CREAT SERPL: 18 (ref 12–20)
CALCIUM SERPL-MCNC: 9.5 MG/DL (ref 8.5–10.1)
CHLORIDE SERPL-SCNC: 107 MMOL/L (ref 97–108)
CHOLEST SERPL-MCNC: 118 MG/DL
CO2 SERPL-SCNC: 29 MMOL/L (ref 21–32)
CREAT SERPL-MCNC: 1.15 MG/DL (ref 0.55–1.02)
DIFFERENTIAL METHOD BLD: NORMAL
EOSINOPHIL # BLD: 0.1 K/UL (ref 0–0.4)
EOSINOPHIL NFR BLD: 1 % (ref 0–7)
ERYTHROCYTE [DISTWIDTH] IN BLOOD BY AUTOMATED COUNT: 12.5 % (ref 11.5–14.5)
EST. AVERAGE GLUCOSE BLD GHB EST-MCNC: 100 MG/DL
GLOBULIN SER CALC-MCNC: 2.6 G/DL (ref 2–4)
GLUCOSE SERPL-MCNC: 96 MG/DL (ref 65–100)
HBA1C MFR BLD: 5.1 % (ref 4–5.6)
HCT VFR BLD AUTO: 39.9 % (ref 35–47)
HCV AB SER IA-ACNC: 0.09 INDEX
HCV AB SERPL QL IA: NONREACTIVE
HDLC SERPL-MCNC: 36 MG/DL
HDLC SERPL: 3.3 (ref 0–5)
HGB BLD-MCNC: 12.9 G/DL (ref 11.5–16)
IMM GRANULOCYTES # BLD AUTO: 0 K/UL (ref 0–0.04)
IMM GRANULOCYTES NFR BLD AUTO: 0 % (ref 0–0.5)
LDLC SERPL CALC-MCNC: 54 MG/DL (ref 0–100)
LYMPHOCYTES # BLD: 2.4 K/UL (ref 0.8–3.5)
LYMPHOCYTES NFR BLD: 22 % (ref 12–49)
MCH RBC QN AUTO: 31.9 PG (ref 26–34)
MCHC RBC AUTO-ENTMCNC: 32.3 G/DL (ref 30–36.5)
MCV RBC AUTO: 98.8 FL (ref 80–99)
MONOCYTES # BLD: 0.5 K/UL (ref 0–1)
MONOCYTES NFR BLD: 5 % (ref 5–13)
NEUTS SEG # BLD: 7.6 K/UL (ref 1.8–8)
NEUTS SEG NFR BLD: 71 % (ref 32–75)
NRBC # BLD: 0 K/UL (ref 0–0.01)
NRBC BLD-RTO: 0 PER 100 WBC
PLATELET # BLD AUTO: 221 K/UL (ref 150–400)
PMV BLD AUTO: 11.9 FL (ref 8.9–12.9)
POTASSIUM SERPL-SCNC: 3.9 MMOL/L (ref 3.5–5.1)
PROT SERPL-MCNC: 6.7 G/DL (ref 6.4–8.2)
RBC # BLD AUTO: 4.04 M/UL (ref 3.8–5.2)
SODIUM SERPL-SCNC: 139 MMOL/L (ref 136–145)
TRIGL SERPL-MCNC: 140 MG/DL
TSH SERPL DL<=0.05 MIU/L-ACNC: 0.93 UIU/ML (ref 0.36–3.74)
VLDLC SERPL CALC-MCNC: 28 MG/DL
WBC # BLD AUTO: 10.7 K/UL (ref 3.6–11)

## 2024-02-15 ENCOUNTER — HOSPITAL ENCOUNTER (OUTPATIENT)
Facility: HOSPITAL | Age: 74
Discharge: HOME | End: 2024-02-15
Attending: FAMILY MEDICINE
Payer: MEDICARE

## 2024-02-15 ENCOUNTER — HOSPITAL ENCOUNTER (OUTPATIENT)
Facility: HOSPITAL | Age: 74
End: 2024-02-15
Attending: FAMILY MEDICINE
Payer: MEDICARE

## 2024-02-15 DIAGNOSIS — R63.4 UNINTENTIONAL WEIGHT LOSS: ICD-10-CM

## 2024-02-15 DIAGNOSIS — Z87.891 PERSONAL HISTORY OF TOBACCO USE: ICD-10-CM

## 2024-02-15 PROCEDURE — 71046 X-RAY EXAM CHEST 2 VIEWS: CPT

## 2024-02-15 PROCEDURE — 71271 CT THORAX LUNG CANCER SCR C-: CPT

## 2024-11-27 ENCOUNTER — APPOINTMENT (OUTPATIENT)
Facility: HOSPITAL | Age: 74
DRG: 313 | End: 2024-11-27
Payer: MEDICARE

## 2024-11-27 ENCOUNTER — HOSPITAL ENCOUNTER (INPATIENT)
Facility: HOSPITAL | Age: 74
LOS: 1 days | Discharge: HOME | DRG: 313 | End: 2024-11-28
Attending: EMERGENCY MEDICINE | Admitting: FAMILY MEDICINE
Payer: MEDICARE

## 2024-11-27 DIAGNOSIS — R07.9 CHEST PAIN, UNSPECIFIED TYPE: Primary | ICD-10-CM

## 2024-11-27 DIAGNOSIS — R01.1 HEART MURMUR: ICD-10-CM

## 2024-11-27 LAB
ALBUMIN SERPL-MCNC: 4 G/DL (ref 3.5–5)
ALBUMIN/GLOB SERPL: 1.5 (ref 1.1–2.2)
ALP SERPL-CCNC: 100 U/L (ref 45–117)
ALT SERPL-CCNC: 18 U/L (ref 12–78)
ANION GAP SERPL CALC-SCNC: 3 MMOL/L (ref 2–12)
AST SERPL-CCNC: 16 U/L (ref 15–37)
BASOPHILS # BLD: 0.1 K/UL (ref 0–0.1)
BASOPHILS NFR BLD: 1 % (ref 0–1)
BILIRUB SERPL-MCNC: 0.4 MG/DL (ref 0.2–1)
BUN SERPL-MCNC: 20 MG/DL (ref 6–20)
BUN/CREAT SERPL: 17 (ref 12–20)
CALCIUM SERPL-MCNC: 9 MG/DL (ref 8.5–10.1)
CHLORIDE SERPL-SCNC: 109 MMOL/L (ref 97–108)
CO2 SERPL-SCNC: 28 MMOL/L (ref 21–32)
COMMENT:: NORMAL
CREAT SERPL-MCNC: 1.2 MG/DL (ref 0.55–1.02)
DIFFERENTIAL METHOD BLD: ABNORMAL
EOSINOPHIL # BLD: 0.1 K/UL (ref 0–0.4)
EOSINOPHIL NFR BLD: 1 % (ref 0–7)
ERYTHROCYTE [DISTWIDTH] IN BLOOD BY AUTOMATED COUNT: 12.2 % (ref 11.5–14.5)
GLOBULIN SER CALC-MCNC: 2.7 G/DL (ref 2–4)
GLUCOSE SERPL-MCNC: 98 MG/DL (ref 65–100)
HCT VFR BLD AUTO: 32.3 % (ref 35–47)
HGB BLD-MCNC: 11.2 G/DL (ref 11.5–16)
IMM GRANULOCYTES # BLD AUTO: 0 K/UL (ref 0–0.04)
IMM GRANULOCYTES NFR BLD AUTO: 0 % (ref 0–0.5)
LYMPHOCYTES # BLD: 3.6 K/UL (ref 0.8–3.5)
LYMPHOCYTES NFR BLD: 38 % (ref 12–49)
MCH RBC QN AUTO: 33 PG (ref 26–34)
MCHC RBC AUTO-ENTMCNC: 34.7 G/DL (ref 30–36.5)
MCV RBC AUTO: 95.3 FL (ref 80–99)
MONOCYTES # BLD: 0.5 K/UL (ref 0–1)
MONOCYTES NFR BLD: 6 % (ref 5–13)
NEUTS SEG # BLD: 5.1 K/UL (ref 1.8–8)
NEUTS SEG NFR BLD: 54 % (ref 32–75)
NRBC # BLD: 0 K/UL (ref 0–0.01)
NRBC BLD-RTO: 0 PER 100 WBC
NT PRO BNP: 3369 PG/ML
PLATELET # BLD AUTO: 216 K/UL (ref 150–400)
PMV BLD AUTO: 10.8 FL (ref 8.9–12.9)
POTASSIUM SERPL-SCNC: 2.8 MMOL/L (ref 3.5–5.1)
PROT SERPL-MCNC: 6.7 G/DL (ref 6.4–8.2)
RBC # BLD AUTO: 3.39 M/UL (ref 3.8–5.2)
SODIUM SERPL-SCNC: 140 MMOL/L (ref 136–145)
SPECIMEN HOLD: NORMAL
TROPONIN I SERPL HS-MCNC: 18 NG/L (ref 0–51)
TROPONIN I SERPL HS-MCNC: 18 NG/L (ref 0–51)
WBC # BLD AUTO: 9.4 K/UL (ref 3.6–11)

## 2024-11-27 PROCEDURE — 80061 LIPID PANEL: CPT

## 2024-11-27 PROCEDURE — 6370000000 HC RX 637 (ALT 250 FOR IP): Performed by: HOSPITALIST

## 2024-11-27 PROCEDURE — 94761 N-INVAS EAR/PLS OXIMETRY MLT: CPT

## 2024-11-27 PROCEDURE — 6370000000 HC RX 637 (ALT 250 FOR IP): Performed by: EMERGENCY MEDICINE

## 2024-11-27 PROCEDURE — 93005 ELECTROCARDIOGRAM TRACING: CPT | Performed by: EMERGENCY MEDICINE

## 2024-11-27 PROCEDURE — 85025 COMPLETE CBC W/AUTO DIFF WBC: CPT

## 2024-11-27 PROCEDURE — 94640 AIRWAY INHALATION TREATMENT: CPT

## 2024-11-27 PROCEDURE — 93005 ELECTROCARDIOGRAM TRACING: CPT | Performed by: FAMILY MEDICINE

## 2024-11-27 PROCEDURE — 2060000000 HC ICU INTERMEDIATE R&B

## 2024-11-27 PROCEDURE — 71275 CT ANGIOGRAPHY CHEST: CPT

## 2024-11-27 PROCEDURE — 83880 ASSAY OF NATRIURETIC PEPTIDE: CPT

## 2024-11-27 PROCEDURE — 6360000002 HC RX W HCPCS: Performed by: HOSPITALIST

## 2024-11-27 PROCEDURE — 71045 X-RAY EXAM CHEST 1 VIEW: CPT

## 2024-11-27 PROCEDURE — 80053 COMPREHEN METABOLIC PANEL: CPT

## 2024-11-27 PROCEDURE — 84484 ASSAY OF TROPONIN QUANT: CPT

## 2024-11-27 PROCEDURE — 6360000004 HC RX CONTRAST MEDICATION: Performed by: EMERGENCY MEDICINE

## 2024-11-27 PROCEDURE — 36415 COLL VENOUS BLD VENIPUNCTURE: CPT

## 2024-11-27 PROCEDURE — 99285 EMERGENCY DEPT VISIT HI MDM: CPT

## 2024-11-27 RX ORDER — ALBUTEROL SULFATE 0.83 MG/ML
2.5 SOLUTION RESPIRATORY (INHALATION) EVERY 6 HOURS PRN
Status: DISCONTINUED | OUTPATIENT
Start: 2024-11-27 | End: 2024-11-28 | Stop reason: HOSPADM

## 2024-11-27 RX ORDER — BUPROPION HYDROCHLORIDE 150 MG/1
300 TABLET ORAL DAILY
Status: DISCONTINUED | OUTPATIENT
Start: 2024-11-27 | End: 2024-11-28 | Stop reason: HOSPADM

## 2024-11-27 RX ORDER — IOPAMIDOL 755 MG/ML
100 INJECTION, SOLUTION INTRAVASCULAR
Status: COMPLETED | OUTPATIENT
Start: 2024-11-27 | End: 2024-11-27

## 2024-11-27 RX ORDER — PRIMIDONE 50 MG/1
50 TABLET ORAL NIGHTLY
Status: DISCONTINUED | OUTPATIENT
Start: 2024-11-27 | End: 2024-11-28 | Stop reason: HOSPADM

## 2024-11-27 RX ORDER — FUROSEMIDE 10 MG/ML
20 INJECTION INTRAMUSCULAR; INTRAVENOUS DAILY
Status: DISCONTINUED | OUTPATIENT
Start: 2024-11-27 | End: 2024-11-28 | Stop reason: HOSPADM

## 2024-11-27 RX ORDER — POTASSIUM CHLORIDE 750 MG/1
40 TABLET, EXTENDED RELEASE ORAL ONCE
Status: COMPLETED | OUTPATIENT
Start: 2024-11-27 | End: 2024-11-27

## 2024-11-27 RX ORDER — POTASSIUM CHLORIDE 750 MG/1
40 TABLET, EXTENDED RELEASE ORAL DAILY
Status: DISCONTINUED | OUTPATIENT
Start: 2024-11-27 | End: 2024-11-28 | Stop reason: HOSPADM

## 2024-11-27 RX ORDER — ASPIRIN 81 MG/1
324 TABLET, CHEWABLE ORAL
Status: COMPLETED | OUTPATIENT
Start: 2024-11-27 | End: 2024-11-27

## 2024-11-27 RX ORDER — ALPRAZOLAM 0.25 MG/1
0.25 TABLET ORAL 2 TIMES DAILY PRN
Status: DISCONTINUED | OUTPATIENT
Start: 2024-11-27 | End: 2024-11-28 | Stop reason: HOSPADM

## 2024-11-27 RX ORDER — VALSARTAN 80 MG/1
80 TABLET ORAL EVERY MORNING
Status: DISCONTINUED | OUTPATIENT
Start: 2024-11-28 | End: 2024-11-28 | Stop reason: HOSPADM

## 2024-11-27 RX ORDER — ASPIRIN 81 MG/1
81 TABLET ORAL DAILY
Status: DISCONTINUED | OUTPATIENT
Start: 2024-11-27 | End: 2024-11-28 | Stop reason: HOSPADM

## 2024-11-27 RX ORDER — ACETAMINOPHEN 325 MG/1
650 TABLET ORAL EVERY 4 HOURS PRN
Status: DISCONTINUED | OUTPATIENT
Start: 2024-11-27 | End: 2024-11-28 | Stop reason: HOSPADM

## 2024-11-27 RX ORDER — ALBUTEROL SULFATE 90 UG/1
2 INHALANT RESPIRATORY (INHALATION) EVERY 8 HOURS
Status: DISCONTINUED | OUTPATIENT
Start: 2024-11-27 | End: 2024-11-27

## 2024-11-27 RX ORDER — ENOXAPARIN SODIUM 100 MG/ML
40 INJECTION SUBCUTANEOUS DAILY
Status: DISCONTINUED | OUTPATIENT
Start: 2024-11-28 | End: 2024-11-28 | Stop reason: HOSPADM

## 2024-11-27 RX ORDER — ATORVASTATIN CALCIUM 20 MG/1
40 TABLET, FILM COATED ORAL NIGHTLY
Status: DISCONTINUED | OUTPATIENT
Start: 2024-11-27 | End: 2024-11-28 | Stop reason: HOSPADM

## 2024-11-27 RX ADMIN — PRIMIDONE 50 MG: 50 TABLET ORAL at 21:34

## 2024-11-27 RX ADMIN — ACETAMINOPHEN 650 MG: 325 TABLET ORAL at 21:45

## 2024-11-27 RX ADMIN — NITROGLYCERIN 0.5 INCH: 20 OINTMENT TOPICAL at 20:45

## 2024-11-27 RX ADMIN — IOPAMIDOL 100 ML: 755 INJECTION, SOLUTION INTRAVENOUS at 17:32

## 2024-11-27 RX ADMIN — POTASSIUM CHLORIDE 40 MEQ: 750 TABLET, EXTENDED RELEASE ORAL at 20:43

## 2024-11-27 RX ADMIN — BUPROPION HYDROCHLORIDE 300 MG: 150 TABLET, EXTENDED RELEASE ORAL at 20:42

## 2024-11-27 RX ADMIN — POTASSIUM CHLORIDE 40 MEQ: 750 TABLET, EXTENDED RELEASE ORAL at 17:42

## 2024-11-27 RX ADMIN — ALBUTEROL SULFATE 2.5 MG: 2.5 SOLUTION RESPIRATORY (INHALATION) at 20:44

## 2024-11-27 RX ADMIN — ALPRAZOLAM 0.25 MG: 0.25 TABLET ORAL at 21:45

## 2024-11-27 RX ADMIN — ATORVASTATIN CALCIUM 40 MG: 20 TABLET, FILM COATED ORAL at 20:42

## 2024-11-27 RX ADMIN — ASPIRIN 324 MG: 81 TABLET, CHEWABLE ORAL at 16:40

## 2024-11-27 RX ADMIN — FUROSEMIDE 20 MG: 10 INJECTION, SOLUTION INTRAMUSCULAR; INTRAVENOUS at 20:44

## 2024-11-27 ASSESSMENT — PAIN DESCRIPTION - DESCRIPTORS: DESCRIPTORS: ACHING

## 2024-11-27 ASSESSMENT — PAIN SCALES - GENERAL
PAINLEVEL_OUTOF10: 3
PAINLEVEL_OUTOF10: 6

## 2024-11-27 ASSESSMENT — PAIN DESCRIPTION - LOCATION
LOCATION: CHEST
LOCATION: HEAD

## 2024-11-27 ASSESSMENT — PAIN DESCRIPTION - PAIN TYPE: TYPE: ACUTE PAIN

## 2024-11-27 ASSESSMENT — PAIN DESCRIPTION - ORIENTATION: ORIENTATION: LEFT

## 2024-11-27 ASSESSMENT — PAIN - FUNCTIONAL ASSESSMENT: PAIN_FUNCTIONAL_ASSESSMENT: PREVENTS OR INTERFERES SOME ACTIVE ACTIVITIES AND ADLS

## 2024-11-27 NOTE — ED TRIAGE NOTES
Patient arrives via EMS as a Code STEMI.     Reports L sided chest pain that began \"at some point today\". Patient reports that she thinks it could be a panic attack because her son has been acting violent lately.     Patient with 6/10 pain in triage. Code STEMI cancelled att following EKG.

## 2024-11-27 NOTE — ED PROVIDER NOTES
[facility] EMERGENCY DEPT  EMERGENCY DEPARTMENT ENCOUNTER        CHIEF COMPLAINT       Chief Complaint   Patient presents with    Chest Pain         HISTORY OF PRESENT ILLNESS      74y F here by EMS with chest pain. Started earlier today. Thought maybe it was a panic attack but wasn't sure so called EMS. Given NTG with improvement. No ASA given as pt stated she had taken some earlier today. EMS ECG read STEMI so code STEMI activated prior to arrival. No hx of ACS. No nausea or vomiting. (+) diaphoresis. Says it feels like someone standing on her chest. Also with some pain in her back and pain with deep inspiration.    Review of External Medical Records:     Nursing Notes were reviewed.    REVIEW OF SYSTEMS         Review of Systems   Constitutional: (-) weight loss.   HEENT: (-) stiff neck   Eyes: (-) discharge.   Respiratory: (-) cough.    Cardiovascular: (-) syncope.   Gastrointestinal: (-) blood in stool.   Genitourinary: (-) hematuria.  Musculoskeletal: (-) myalgias.   Neurological: (-) seizure.   Skin: (-) petechiae  Lymph/Immunologic: (-) enlarged lymph nodes  All other systems reviewed and are negative.          PAST MEDICAL HISTORY     Past Medical History:   Diagnosis Date    AAA (abdominal aortic aneurysm) ([facility])     repaired 2021    Adverse effect of anesthesia     required albuterol treatments post cholecystectomy    Anxiety     Arthritis     Bigeminy     COPD (chronic obstructive pulmonary disease) ([facility])     Depression     Essential tremor     saw Neurology once when dx \"years ago\", controlled with meds as of 2023    Falls     per pt as of 9/8/2023 last fall was 8 weeks ago when tripped on stair, prior that  3 months ago when tripped over dog    Fatigue     Hypercholesterolemia     Hypertension     Multiple thyroid nodules     hasn't had testing in past few yrs as of 2023 per pt    Renal artery stenosis ([facility])     stent placed 2021 during AAA repair    Vertigo     Visual disturbance     floaters per pt  regarding hospitalization.          74y F here with chest pain. Differential includes STEMI, ACS, PE, dissection, and others. No STEMI on ECG here. Will need blood work, ECG, CXR, CTA chest. Will be admitted.    ED EKG interpretation:  Rhythm: sinus rhythm; and regular . Rate (approx.): 67; Axis: normal; P wave: normal; QRS interval: normal ; ST/T wave: normal;  This EKG was interpreted by William Mosqueda MD,ED Provider.    Perfect Serve Consult for Admission  6:52 PM    ED Room Number: ER11/11  Patient Name and age:  Xiao Krishnamurthy 74 y.o.  female  Working Diagnosis:   1. Chest pain, unspecified type        COVID-19 Suspicion: no  Sepsis present:  no  Reassessment needed: no  Code Status:  Full Code  Readmission: no  Isolation Requirements:  no  Recommended Level of Care:  telemetry  Department: [facility] ([phone])    Other:  74y F here with chest pain. Came in as code STEMI but no STEMI on ECG. Started earlier today - feels like someone standing on her chest. Also with diaphoresis and shortness of breath. Got NTG by EMS with improvement. Some pain radiating to the back as well. Troponin is 18. BNP elevated in the 3000's. CXR clear. CTA chest clear.        Procedures  Unless otherwise noted below, none     Procedures                (Please note that portions of this note were completed with a voice recognition program.  Efforts were made to edit the dictations but occasionally words are mis-transcribed.)    William Mosqueda MD (electronically signed)  Emergency Attending Physician / Physician Assistant / Nurse Practitioner             William Mosqueda MD  11/27/24 7566       William Mosqueda MD  11/27/24 8179       William Mosqueda MD  11/27/24 4605

## 2024-11-27 NOTE — PROGRESS NOTES
Spiritual Health History and Assessment/Progress Note  [facility]    Crisis,  ,  ,      Name: Xiao Krishnamurthy MRN: [id number]    Age: 74 y.o.     Sex: female   Language: English   Orthodoxy: Amish   <principal problem not specified>     Date: 11/27/2024            Total Time Calculated: 17 min              Spiritual Assessment began in [facility] EMERGENCY DEPT            Encounter Overview/Reason: Crisis  Service Provided For: Patient not available    Fany, Belief, Meaning:   Patient unable to assess at this time  Family/Friends No family/friends present      Importance and Influence:  Patient unable to assess at this time  Family/Friends No family/friends present    Community:  Patient   Family/Friends     Assessment and Plan of Care:     Patient Interventions include:   Family/Friends Interventions include:     Patient Plan of Care:   Family/Friends Plan of Care:     Narrative: Code Stemi page;  arrived to Emergency Department same time EMS brought in Pt.  standing by looking for any family and providing a prayerful presence as medical team evaluated Pt. Stemi canceled;  consulted with staff and EMS about any family, unknown at this time.      Electronically signed by NIA Barrios on 11/27/2024 at 4:56 PM   For  support, please call Spiritual Health Team @ [phone]

## 2024-11-28 VITALS
DIASTOLIC BLOOD PRESSURE: 63 MMHG | SYSTOLIC BLOOD PRESSURE: 133 MMHG | WEIGHT: 136.69 LBS | HEART RATE: 66 BPM | TEMPERATURE: 98.1 F | RESPIRATION RATE: 16 BRPM | HEIGHT: 62 IN | OXYGEN SATURATION: 96 % | BODY MASS INDEX: 25.15 KG/M2

## 2024-11-28 LAB
ANION GAP SERPL CALC-SCNC: 2 MMOL/L (ref 2–12)
BASOPHILS # BLD: 0.1 K/UL (ref 0–0.1)
BASOPHILS NFR BLD: 1 % (ref 0–1)
BUN SERPL-MCNC: 18 MG/DL (ref 6–20)
BUN/CREAT SERPL: 16 (ref 12–20)
CALCIUM SERPL-MCNC: 8.9 MG/DL (ref 8.5–10.1)
CHLORIDE SERPL-SCNC: 111 MMOL/L (ref 97–108)
CHOLEST SERPL-MCNC: 139 MG/DL
CO2 SERPL-SCNC: 27 MMOL/L (ref 21–32)
CREAT SERPL-MCNC: 1.13 MG/DL (ref 0.55–1.02)
DIFFERENTIAL METHOD BLD: ABNORMAL
EOSINOPHIL # BLD: 0.1 K/UL (ref 0–0.4)
EOSINOPHIL NFR BLD: 1 % (ref 0–7)
ERYTHROCYTE [DISTWIDTH] IN BLOOD BY AUTOMATED COUNT: 12.4 % (ref 11.5–14.5)
GLUCOSE SERPL-MCNC: 110 MG/DL (ref 65–100)
HCT VFR BLD AUTO: 34.8 % (ref 35–47)
HDLC SERPL-MCNC: 37 MG/DL
HDLC SERPL: 3.8 (ref 0–5)
HGB BLD-MCNC: 11.9 G/DL (ref 11.5–16)
IMM GRANULOCYTES # BLD AUTO: 0 K/UL (ref 0–0.04)
IMM GRANULOCYTES NFR BLD AUTO: 0 % (ref 0–0.5)
LDLC SERPL CALC-MCNC: 62.2 MG/DL (ref 0–100)
LYMPHOCYTES # BLD: 2.4 K/UL (ref 0.8–3.5)
LYMPHOCYTES NFR BLD: 36 % (ref 12–49)
MCH RBC QN AUTO: 33.1 PG (ref 26–34)
MCHC RBC AUTO-ENTMCNC: 34.2 G/DL (ref 30–36.5)
MCV RBC AUTO: 96.9 FL (ref 80–99)
MONOCYTES # BLD: 0.3 K/UL (ref 0–1)
MONOCYTES NFR BLD: 5 % (ref 5–13)
NEUTS SEG # BLD: 3.8 K/UL (ref 1.8–8)
NEUTS SEG NFR BLD: 57 % (ref 32–75)
NRBC # BLD: 0 K/UL (ref 0–0.01)
NRBC BLD-RTO: 0 PER 100 WBC
PLATELET # BLD AUTO: 218 K/UL (ref 150–400)
PMV BLD AUTO: 11.2 FL (ref 8.9–12.9)
POTASSIUM SERPL-SCNC: 3.4 MMOL/L (ref 3.5–5.1)
RBC # BLD AUTO: 3.59 M/UL (ref 3.8–5.2)
SODIUM SERPL-SCNC: 140 MMOL/L (ref 136–145)
TRIGL SERPL-MCNC: 199 MG/DL
TROPONIN I SERPL HS-MCNC: 13 NG/L (ref 0–51)
VLDLC SERPL CALC-MCNC: 39.8 MG/DL
WBC # BLD AUTO: 6.6 K/UL (ref 3.6–11)

## 2024-11-28 PROCEDURE — 94761 N-INVAS EAR/PLS OXIMETRY MLT: CPT

## 2024-11-28 PROCEDURE — 99223 1ST HOSP IP/OBS HIGH 75: CPT | Performed by: INTERNAL MEDICINE

## 2024-11-28 PROCEDURE — 99238 HOSP IP/OBS DSCHRG MGMT 30/<: CPT | Performed by: FAMILY MEDICINE

## 2024-11-28 PROCEDURE — APPSS45 APP SPLIT SHARED TIME 31-45 MINUTES: Performed by: NURSE PRACTITIONER

## 2024-11-28 PROCEDURE — 6370000000 HC RX 637 (ALT 250 FOR IP)

## 2024-11-28 PROCEDURE — 6360000002 HC RX W HCPCS: Performed by: HOSPITALIST

## 2024-11-28 PROCEDURE — 6370000000 HC RX 637 (ALT 250 FOR IP): Performed by: HOSPITALIST

## 2024-11-28 PROCEDURE — 84484 ASSAY OF TROPONIN QUANT: CPT

## 2024-11-28 PROCEDURE — 36415 COLL VENOUS BLD VENIPUNCTURE: CPT

## 2024-11-28 PROCEDURE — 85025 COMPLETE CBC W/AUTO DIFF WBC: CPT

## 2024-11-28 PROCEDURE — 80048 BASIC METABOLIC PNL TOTAL CA: CPT

## 2024-11-28 RX ORDER — PROPRANOLOL HYDROCHLORIDE 10 MG/1
20 TABLET ORAL ONCE
Status: COMPLETED | OUTPATIENT
Start: 2024-11-28 | End: 2024-11-28

## 2024-11-28 RX ORDER — FUROSEMIDE 20 MG/1
20 TABLET ORAL DAILY
Qty: 3 TABLET | Refills: 0 | Status: SHIPPED | OUTPATIENT
Start: 2024-11-28 | End: 2024-12-01

## 2024-11-28 RX ORDER — PROPRANOLOL HCL 20 MG
20 TABLET ORAL DAILY
Qty: 90 TABLET | Refills: 0 | Status: SHIPPED | OUTPATIENT
Start: 2024-11-28

## 2024-11-28 RX ORDER — POTASSIUM CHLORIDE 750 MG/1
20 TABLET, EXTENDED RELEASE ORAL DAILY
Qty: 60 TABLET | Refills: 1 | Status: SHIPPED
Start: 2024-11-28 | End: 2024-11-28

## 2024-11-28 RX ORDER — PROPRANOLOL HCL 20 MG
20 TABLET ORAL DAILY
Qty: 90 TABLET | Refills: 0 | Status: SHIPPED
Start: 2024-11-28 | End: 2024-11-28

## 2024-11-28 RX ORDER — POTASSIUM CHLORIDE 750 MG/1
20 TABLET, EXTENDED RELEASE ORAL DAILY
Qty: 60 TABLET | Refills: 1 | Status: SHIPPED | OUTPATIENT
Start: 2024-11-28

## 2024-11-28 RX ADMIN — PROPRANOLOL HYDROCHLORIDE 20 MG: 10 TABLET ORAL at 10:59

## 2024-11-28 RX ADMIN — ACETAMINOPHEN 650 MG: 325 TABLET ORAL at 04:10

## 2024-11-28 RX ADMIN — BUPROPION HYDROCHLORIDE 300 MG: 150 TABLET, EXTENDED RELEASE ORAL at 08:12

## 2024-11-28 RX ADMIN — FUROSEMIDE 20 MG: 10 INJECTION, SOLUTION INTRAMUSCULAR; INTRAVENOUS at 08:12

## 2024-11-28 RX ADMIN — POTASSIUM CHLORIDE 40 MEQ: 750 TABLET, EXTENDED RELEASE ORAL at 08:12

## 2024-11-28 RX ADMIN — ALPRAZOLAM 0.25 MG: 0.25 TABLET ORAL at 04:11

## 2024-11-28 RX ADMIN — ACETAMINOPHEN 650 MG: 325 TABLET ORAL at 08:12

## 2024-11-28 RX ADMIN — ASPIRIN 81 MG: 81 TABLET, COATED ORAL at 08:12

## 2024-11-28 RX ADMIN — VALSARTAN 80 MG: 80 TABLET ORAL at 08:12

## 2024-11-28 ASSESSMENT — PAIN SCALES - WONG BAKER: WONGBAKER_NUMERICALRESPONSE: NO HURT

## 2024-11-28 ASSESSMENT — PAIN SCALES - GENERAL
PAINLEVEL_OUTOF10: 6
PAINLEVEL_OUTOF10: 7

## 2024-11-28 ASSESSMENT — PAIN DESCRIPTION - LOCATION: LOCATION: HEAD

## 2024-11-28 NOTE — ED NOTES
Patient resting comfortably without any needs or complaints. Vital signs stable. Call bell in reach
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
## 2024-11-28 NOTE — PROGRESS NOTES
Spiritual Health History and Assessment/Progress Note  [facility]    Crisis, Emotional distress,  ,      Name: Xiao Krishnamurthy MRN: [id number]    Age: 74 y.o.     Sex: female   Language: English   Yarsanism: Baptist   Chest pain     Date: 11/28/2024            Total Time Calculated: 14 min              Spiritual Assessment began in [facility] B[facility] INTERMEDIATE CARE UNIT        Referral/Consult From: Nurse   Encounter Overview/Reason: Crisis  Service Provided For: Patient    Fany, Belief, Meaning:   Patient is connected with a fany tradition or spiritual practice  Family/Friends No family/friends present      Importance and Influence:  Patient has spiritual/personal beliefs that influence decisions regarding their health  Family/Friends No family/friends present    Community:  Patient feels well-supported. Support system includes: Children and Extended family  Family/Friends No family/friends present    Assessment and Plan of Care:   Patient Interventions include: Facilitated expression of thoughts and feelings and Provided sacramental/Church ritual  Family/Friends Interventions include: No family/friends present    Patient Plan of Care: Spiritual Care available upon further referral  Family/Friends Plan of Care: No family/friends present    Chart review. Received and responded to spiritual consult for patient suffering depression. Conducted spiritual assessment. Met and conversed with patient. Patient was raised Baptist. Patient stated that she is Baptist. She does pray to God and enjoys walking around on her property. Patient has eleven acres of land and enjoy the outdoor nature. Patient shared how she is still grieving the loss of her spouse who passed away on December 30th 2023. They had been  to fifty-three years. Patient said her Orthodoxy is important to her. Patient's Orthodoxy influences her morals, values, and ethics. Patient has three sons. Patient shared medical issues and crises.

## 2024-11-28 NOTE — DISCHARGE INSTRUCTIONS
HOME DISCHARGE INSTRUCTIONS    Xiao Krishnamurthy / [id number] : 1950    Admission date: 2024 Discharge date: 2024 10:37 AM     Please bring this form with you to show your care provider at your follow-up appointment.    Primary care provider:  Олег Forbes      Discharging provider:  Sumaya Reveles MD  - Family Medicine Resident  Nadia Rios DO - Family Medicine Attending      You have been admitted to the hospital with the following diagnoses:    ACUTE DIAGNOSES:  Heart murmur [R01.1]  Chest pain [R07.9]  Chest pain, unspecified type [R07.9]    . . . . . . . . . . . . . . . . . . . . . . . . . . . . . . . . . . . . . . . . . . . . . . . . . . . . . . . . . . . . . . . . . . . . . . . .   You were admitted to the hospital with concern for chest pain. Your heart enzymes and imaging of your chest was normal. Your chest pain resolved as well. There is no evidence of fluid building up due to strain in your heart, which could be a sign of heart failure. After speaking with you and reviewing your results, we feel comfortable discharging you in your stable condition to follow up with your Cardiologist and PCP in the next 1-2 weeks. Please continue to take your other home medications as prescribed.   . . . . . . . . . . . . . . . . . . . . . . . . . . . . . . . . . . . . . . . . . . . . . . . . . . . . . . . . . . . . . . . . . . . . . . . .     FOLLOW-UP CARE RECOMMENDATIONS:    Medication changes:   - Potassium 20mEq daily (Increased dose).   - Restart taking your propranolol 20mg daily.   - Taking lasix 20mg daily for 3 more days starting tomorrow     Appointments  No future appointments.    Олег Forbes MD  [address]  [address]  [facility] [address] [phone]    Call  to schedule a hospital follow up within 1-2 weeks. You need your blood pressure checked, your potassium checked and you will need a formal assessement of your COPD.    Seamus Robbins MD  [address] [place]  [id number] [phone]    Call  to schedule a hospital follow up within 1-2 weeks. You will need an outpatient stress test.       Follow-up tests needed: BMP to recheck your potassium.     Pending test results:   At the time of your discharge the following test results are still pending: none.   Please make sure you review these results with your outpatient follow-up provider(s).    DIET/what to eat:  regular diet    ACTIVITY:  activity as tolerated    Wound care: none    Equipment needed:  none    Specific symptoms to watch for: chest pain, shortness of breath, fever, chills, nausea, vomiting, diarrhea, change in mentation, falling, weakness, bleeding.     What to do if new or unexpected symptoms occur?    If you experience any of the above symptoms (or should other concerns or questions arise after discharge) please call your primary care physician. Return to the emergency room if you cannot get hold of your doctor.    It is very important that you keep your follow-up appointment(s).  Please bring discharge papers, medication list (and/or medication bottles) to your follow-up appointments for review by your outpatient provider(s).  Please check the list of medications and be sure it includes every medication (even non-prescription medications) that your provider wants you to take.    It is important that you take the medication exactly as they are prescribed.   Keep your medication in the bottles provided by the pharmacist and keep a list of the medication names, dosages, and times to be taken in your wallet.   Do not take other medications without consulting your doctor.   If you have any questions about your medications or other instructions, please talk to your nurse or care provider before you leave the hospital.     Information obtained by:     I understand that if any problems occur once I am at home I am to contact my physician.    These instructions were explained to me and I had the opportunity to ask
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
## 2024-11-28 NOTE — DISCHARGE SUMMARY
Discharge / Transfer / Off-Service Note     Name: Xiao Krishnamurthy MRN: [id number]  Sex: Female   YOB: 1950  Age: 74 y.o.  PCP: Олег Forbes MD     Date of admission: 11/27/2024  Date of discharge/transfer: 11/28/2024    Attending physician at admission: Dr. Milagro Galvan  Attending physician at discharge/transfer: Dr. Nadia Rios.  Resident physician at discharge/transfer: Sumaya Reveles MD     Consultants during hospitalization  IP CONSULT TO CARDIOLOGY  IP CONSULT TO SPIRITUAL SERVICES     Admission diagnoses   Heart murmur [R01.1]  Chest pain [R07.9]  Chest pain, unspecified type [R07.9]    Recommended follow-up after discharge    1. PCP-Олег Forbes MD  2. Cardiology Dr. Seamus Robbins     Things to follow up on with PCP:   - BMP to check potassium after increasing po Potassium.   - BP check after resuming propranolol 20mg QD for hx of palpitations (stopped in July 2024).   - Recommend COPD assessment OP.      Things to follow up on with cardiology:   - Hospital follow up after episode of CP. Needs stress test OP.     Medication Changes:     Medication List        START taking these medications      furosemide 20 MG tablet  Commonly known as: Lasix  Take 1 tablet by mouth daily for 3 days            CHANGE how you take these medications      potassium chloride 10 MEQ extended release tablet  Commonly known as: KLOR-CON  Take 2 tablets by mouth daily  What changed: how much to take     propranolol 20 MG tablet  Commonly known as: INDERAL  Take 1 tablet by mouth daily  What changed: See the new instructions.            CONTINUE taking these medications      albuterol (2.5 MG/3ML) 0.083% nebulizer solution  Commonly known as: PROVENTIL     ALPRAZolam 0.5 MG tablet  Commonly known as: XANAX     aspirin 81 MG EC tablet     atorvastatin 40 MG tablet  Commonly known as: LIPITOR     buPROPion 300 MG extended release tablet  Commonly known as: WELLBUTRIN XL     hydroCHLOROthiazide 25 MG

## 2024-11-29 ENCOUNTER — TELEPHONE (OUTPATIENT)
Age: 74
End: 2024-11-29

## 2024-11-29 NOTE — TELEPHONE ENCOUNTER
Care Transitions Initial Follow Up Call    Outreach made within 2 business days of discharge: Yes    Patient: Xiao Krishnamurthy Patient : 1950   MRN: [id number]  Reason for Admission: Chest pain   Discharge Date: 24       Spoke with: patient     Discharge department/facility: pt will be seeing new PCP due to distance       Follow Up  No future appointments.    MAURO COSTA MA

## 2024-11-30 LAB
EKG ATRIAL RATE: 59 BPM
EKG ATRIAL RATE: 67 BPM
EKG DIAGNOSIS: NORMAL
EKG DIAGNOSIS: NORMAL
EKG P AXIS: 23 DEGREES
EKG P AXIS: 51 DEGREES
EKG P-R INTERVAL: 120 MS
EKG P-R INTERVAL: 136 MS
EKG Q-T INTERVAL: 412 MS
EKG Q-T INTERVAL: 420 MS
EKG QRS DURATION: 72 MS
EKG QRS DURATION: 86 MS
EKG QTC CALCULATION (BAZETT): 407 MS
EKG QTC CALCULATION (BAZETT): 443 MS
EKG R AXIS: 26 DEGREES
EKG R AXIS: 4 DEGREES
EKG T AXIS: 53 DEGREES
EKG T AXIS: 64 DEGREES
EKG VENTRICULAR RATE: 59 BPM
EKG VENTRICULAR RATE: 67 BPM

## 2024-11-30 PROCEDURE — 93010 ELECTROCARDIOGRAM REPORT: CPT | Performed by: INTERNAL MEDICINE

## (undated) DEVICE — ROBOTIC GENERAL-SFMC: Brand: MEDLINE INDUSTRIES, INC.

## (undated) DEVICE — SOLUTION IRRIG 1000ML STRL H2O USP PLAS POUR BTL

## (undated) DEVICE — GLOVE ORTHO 8   MSG9480

## (undated) DEVICE — SUTURE PDS II SZ 0 L27IN ABSRB VLT L26MM CT-2 1/2 CIR Z334H

## (undated) DEVICE — TRANSFER SET 3": Brand: MEDLINE INDUSTRIES, INC.

## (undated) DEVICE — CANNULA SEAL

## (undated) DEVICE — SUTURE 1 STRATAFIX SYMMETRIC PDS + 30CM CT-1 SXPP1A435

## (undated) DEVICE — BLADELESS OBTURATOR: Brand: WECK VISTA

## (undated) DEVICE — SUTURE DEV SZ 2-0 WND CLSR ABSRB GS-22 VLOC COVIDIEN VLOCM2145

## (undated) DEVICE — LAPAROSCOPIC TROCAR SLEEVE/SINGLE USE: Brand: KII® OPTICAL ACCESS SYSTEM

## (undated) DEVICE — SUTURE MCRYL SZ 4-0 L27IN ABSRB UD L19MM PS-2 1/2 CIR PRIM Y426H

## (undated) DEVICE — SUTURE SZ 0 27IN 5/8 CIR UR-6  TAPER PT VIOLET ABSRB VICRYL J603H

## (undated) DEVICE — TIP COVER ACCESSORY

## (undated) DEVICE — ELECTRO LUBE IS A SINGLE PATIENT USE DEVICE THAT IS INTENDED TO BE USED ON ELECTROSURGICAL ELECTRODES TO REDUCE STICKING.: Brand: KEY SURGICAL ELECTRO LUBE

## (undated) DEVICE — ARM DRAPE

## (undated) DEVICE — LIQUIBAND RAPID ADHESIVE 36/CS 0.8ML: Brand: MEDLINE

## (undated) DEVICE — NEEDLE SPNL L3.5IN PNK HUB S STL REG WALL FIT STYL W/ QNCKE

## (undated) DEVICE — AIRSEAL BIFURCATED SMOKE EVAC FILTERED TUBE SET: Brand: AIRSEAL